# Patient Record
Sex: FEMALE | Race: WHITE | Employment: FULL TIME | URBAN - METROPOLITAN AREA
[De-identification: names, ages, dates, MRNs, and addresses within clinical notes are randomized per-mention and may not be internally consistent; named-entity substitution may affect disease eponyms.]

---

## 2018-11-27 PROBLEM — N93.9 ABNORMAL UTERINE BLEEDING: Status: ACTIVE | Noted: 2018-11-27

## 2019-09-04 ENCOUNTER — OFFICE VISIT (OUTPATIENT)
Dept: FAMILY MEDICINE CLINIC | Age: 24
End: 2019-09-04
Payer: COMMERCIAL

## 2019-09-04 VITALS
DIASTOLIC BLOOD PRESSURE: 76 MMHG | OXYGEN SATURATION: 97 % | SYSTOLIC BLOOD PRESSURE: 114 MMHG | HEIGHT: 59 IN | WEIGHT: 293 LBS | BODY MASS INDEX: 59.07 KG/M2 | HEART RATE: 79 BPM

## 2019-09-04 DIAGNOSIS — Z00.00 ENCOUNTER FOR MEDICAL EXAMINATION TO ESTABLISH CARE: ICD-10-CM

## 2019-09-04 DIAGNOSIS — N91.1 AMENORRHEA, SECONDARY: ICD-10-CM

## 2019-09-04 DIAGNOSIS — N91.1 AMENORRHEA, SECONDARY: Primary | ICD-10-CM

## 2019-09-04 LAB
ALBUMIN SERPL-MCNC: 3.8 G/DL (ref 3.5–4.6)
ALP BLD-CCNC: 85 U/L (ref 40–130)
ALT SERPL-CCNC: 13 U/L (ref 0–33)
ANION GAP SERPL CALCULATED.3IONS-SCNC: 14 MEQ/L (ref 9–15)
AST SERPL-CCNC: 17 U/L (ref 0–35)
BILIRUB SERPL-MCNC: 0.4 MG/DL (ref 0.2–0.7)
BUN BLDV-MCNC: 9 MG/DL (ref 6–20)
CALCIUM SERPL-MCNC: 8.8 MG/DL (ref 8.5–9.9)
CHLORIDE BLD-SCNC: 102 MEQ/L (ref 95–107)
CO2: 24 MEQ/L (ref 20–31)
CREAT SERPL-MCNC: 0.47 MG/DL (ref 0.5–0.9)
ESTRADIOL LEVEL: 64 PG/ML
FOLLICLE STIMULATING HORMONE: 4.3 MIU/ML
GFR AFRICAN AMERICAN: >60
GFR NON-AFRICAN AMERICAN: >60
GLOBULIN: 3.7 G/DL (ref 2.3–3.5)
GLUCOSE BLD-MCNC: 134 MG/DL (ref 70–99)
HBA1C MFR BLD: 5.7 % (ref 4.8–5.9)
HCG QUALITATIVE: NEGATIVE
HCT VFR BLD CALC: 41.2 % (ref 37–47)
HEMOGLOBIN: 13.7 G/DL (ref 12–16)
LUTEINIZING HORMONE: 6.8 MIU/ML
MCH RBC QN AUTO: 30.4 PG (ref 27–31.3)
MCHC RBC AUTO-ENTMCNC: 33.2 % (ref 33–37)
MCV RBC AUTO: 91.7 FL (ref 82–100)
PDW BLD-RTO: 13.3 % (ref 11.5–14.5)
PLATELET # BLD: 385 K/UL (ref 130–400)
POTASSIUM SERPL-SCNC: 4.2 MEQ/L (ref 3.4–4.9)
PROLACTIN: 6.5 NG/ML
RBC # BLD: 4.5 M/UL (ref 4.2–5.4)
SODIUM BLD-SCNC: 140 MEQ/L (ref 135–144)
TOTAL PROTEIN: 7.5 G/DL (ref 6.3–8)
TSH REFLEX: 2.8 UIU/ML (ref 0.44–3.86)
WBC # BLD: 10.1 K/UL (ref 4.8–10.8)

## 2019-09-04 PROCEDURE — 99204 OFFICE O/P NEW MOD 45 MIN: CPT | Performed by: FAMILY MEDICINE

## 2019-09-04 SDOH — HEALTH STABILITY: MENTAL HEALTH: HOW OFTEN DO YOU HAVE A DRINK CONTAINING ALCOHOL?: MONTHLY OR LESS

## 2019-09-04 SDOH — HEALTH STABILITY: MENTAL HEALTH: HOW MANY STANDARD DRINKS CONTAINING ALCOHOL DO YOU HAVE ON A TYPICAL DAY?: 1 OR 2

## 2019-09-04 ASSESSMENT — ENCOUNTER SYMPTOMS
SHORTNESS OF BREATH: 0
COUGH: 0
ABDOMINAL PAIN: 0
WHEEZING: 0
SORE THROAT: 0
CONSTIPATION: 0
DIARRHEA: 0
RHINORRHEA: 0

## 2019-09-04 ASSESSMENT — PATIENT HEALTH QUESTIONNAIRE - PHQ9
SUM OF ALL RESPONSES TO PHQ9 QUESTIONS 1 & 2: 0
SUM OF ALL RESPONSES TO PHQ QUESTIONS 1-9: 0
2. FEELING DOWN, DEPRESSED OR HOPELESS: 0
1. LITTLE INTEREST OR PLEASURE IN DOING THINGS: 0
SUM OF ALL RESPONSES TO PHQ QUESTIONS 1-9: 0

## 2019-09-04 NOTE — PROGRESS NOTES
place, and time. She appears well-developed and well-nourished. No distress. HENT:   Head: Normocephalic and atraumatic. Right Ear: Tympanic membrane, external ear and ear canal normal. Tympanic membrane is not erythematous. Tympanic membrane mobility is normal.   Left Ear: Tympanic membrane, external ear and ear canal normal. Tympanic membrane is not erythematous. Tympanic membrane mobility is normal.   Nose: Nose normal.   Mouth/Throat: Oropharynx is clear and moist. No oropharyngeal exudate. Eyes: EOM are normal.   Neck: Normal range of motion. No thyromegaly present. Cardiovascular: Normal rate, regular rhythm and normal heart sounds. No murmur heard. Pulmonary/Chest: Effort normal and breath sounds normal. No respiratory distress. She has no wheezes. Abdominal: Soft. Bowel sounds are normal. She exhibits no distension. There is no tenderness. There is no rebound and no guarding. Musculoskeletal: She exhibits no edema. Lymphadenopathy:     She has no cervical adenopathy. Neurological: She is alert and oriented to person, place, and time. Skin: Skin is warm and dry. Psychiatric: She has a normal mood and affect. Her behavior is normal.   Vitals reviewed. Assessment/Plan:  25 y.o. female here mainly for establish and secondary amenorrhea:  - I suspect PCOS given the excessive wt gain; checking hormone levels and pelvic US. Diagnosis Orders   1. Amenorrhea, secondary  Follicle Stimulating Hormone    Luteinizing Hormone    Estradiol    Dhea    TSH with Reflex    Prolactin    CBC    Comprehensive Metabolic Panel    US PELVIS COMPLETE    HCG, Serum, Qualitative    Hemoglobin A1C    Testosterone Free And Total, Non Male   2. Encounter for medical examination to establish care          Return if symptoms worsen or fail to improve.     Dahlia Gonzalez MD

## 2019-09-08 LAB
DHEA: 4.4 NG/ML (ref 1.33–7.78)
SEX HORMONE BINDING GLOBULIN: 51 NMOL/L (ref 30–135)
TESTOSTERONE FREE: 5 PG/ML (ref 0.8–7.4)
TESTOSTERONE, FEMALES/CHILDREN: 39 NG/DL (ref 9–55)

## 2019-09-09 DIAGNOSIS — N91.1 AMENORRHEA, SECONDARY: Primary | ICD-10-CM

## 2019-09-13 ENCOUNTER — TELEPHONE (OUTPATIENT)
Dept: FAMILY MEDICINE CLINIC | Age: 24
End: 2019-09-13

## 2019-09-13 DIAGNOSIS — N91.1 AMENORRHEA, SECONDARY: Primary | ICD-10-CM

## 2019-09-13 NOTE — TELEPHONE ENCOUNTER
Flakita Frye from [address] [place] [address] called in because patient is scheduled on 9/17/19 to get a Pelvic US but they also need the Non-transvaginal US ordered as well just in case. Please advise.

## 2019-09-26 ENCOUNTER — HOSPITAL ENCOUNTER (OUTPATIENT)
Dept: LAB | Age: 24
Discharge: HOME | End: 2019-09-26
Payer: COMMERCIAL

## 2019-09-26 ENCOUNTER — OFFICE VISIT (OUTPATIENT)
Dept: OBGYN CLINIC | Age: 24
End: 2019-09-26
Payer: COMMERCIAL

## 2019-09-26 VITALS
SYSTOLIC BLOOD PRESSURE: 120 MMHG | WEIGHT: 293 LBS | DIASTOLIC BLOOD PRESSURE: 80 MMHG | BODY MASS INDEX: 59.07 KG/M2 | HEIGHT: 59 IN

## 2019-09-26 DIAGNOSIS — N91.1 SECONDARY AMENORRHEA: ICD-10-CM

## 2019-09-26 DIAGNOSIS — E66.01 MORBID OBESITY (HCC): ICD-10-CM

## 2019-09-26 DIAGNOSIS — N91.1 SECONDARY AMENORRHEA: Primary | ICD-10-CM

## 2019-09-26 LAB — INSULIN: 34.4 UIU/ML (ref 2.6–24.9)

## 2019-09-26 PROCEDURE — 36415 COLL VENOUS BLD VENIPUNCTURE: CPT

## 2019-09-26 PROCEDURE — 99203 OFFICE O/P NEW LOW 30 MIN: CPT | Performed by: OBSTETRICS & GYNECOLOGY

## 2019-09-26 PROCEDURE — 83525 ASSAY OF INSULIN: CPT

## 2019-09-26 ASSESSMENT — ENCOUNTER SYMPTOMS
APNEA: 0
ABDOMINAL PAIN: 0
SHORTNESS OF BREATH: 0
DIARRHEA: 0
ABDOMINAL DISTENTION: 0
CONSTIPATION: 0

## 2019-09-26 NOTE — PROGRESS NOTES
The patient was asked if she would like a chaperone present for her intimate exam. She  Declined the chaperone.  Sarika Chen

## 2020-05-21 ENCOUNTER — VIRTUAL VISIT (OUTPATIENT)
Dept: FAMILY MEDICINE CLINIC | Age: 25
End: 2020-05-21
Payer: COMMERCIAL

## 2020-05-21 PROCEDURE — 99213 OFFICE O/P EST LOW 20 MIN: CPT | Performed by: FAMILY MEDICINE

## 2020-05-21 RX ORDER — CEPHALEXIN 500 MG/1
500 CAPSULE ORAL 4 TIMES DAILY
Qty: 28 CAPSULE | Refills: 0 | Status: SHIPPED | OUTPATIENT
Start: 2020-05-21 | End: 2020-05-28

## 2020-05-21 RX ORDER — CEPHALEXIN 500 MG/1
500 CAPSULE ORAL 4 TIMES DAILY
Qty: 28 CAPSULE | Refills: 0 | Status: SHIPPED | OUTPATIENT
Start: 2020-05-21 | End: 2020-05-21

## 2020-05-21 ASSESSMENT — PATIENT HEALTH QUESTIONNAIRE - PHQ9
1. LITTLE INTEREST OR PLEASURE IN DOING THINGS: 0
SUM OF ALL RESPONSES TO PHQ9 QUESTIONS 1 & 2: 0
SUM OF ALL RESPONSES TO PHQ QUESTIONS 1-9: 0
2. FEELING DOWN, DEPRESSED OR HOPELESS: 0
SUM OF ALL RESPONSES TO PHQ QUESTIONS 1-9: 0

## 2020-05-21 ASSESSMENT — ENCOUNTER SYMPTOMS
RHINORRHEA: 0
ABDOMINAL PAIN: 0
WHEEZING: 0
COUGH: 0
SHORTNESS OF BREATH: 0
CONSTIPATION: 0
DIARRHEA: 0
SORE THROAT: 0

## 2020-05-27 ENCOUNTER — OFFICE VISIT (OUTPATIENT)
Dept: FAMILY MEDICINE CLINIC | Age: 25
End: 2020-05-27
Payer: COMMERCIAL

## 2020-05-27 PROCEDURE — 99213 OFFICE O/P EST LOW 20 MIN: CPT | Performed by: FAMILY MEDICINE

## 2020-05-27 RX ORDER — NORGESTIMATE AND ETHINYL ESTRADIOL 0.25-0.035
1 KIT ORAL DAILY
Qty: 3 PACKET | Refills: 3 | Status: SHIPPED | OUTPATIENT
Start: 2020-05-27 | End: 2020-06-12 | Stop reason: ALTCHOICE

## 2020-05-27 ASSESSMENT — ENCOUNTER SYMPTOMS
SHORTNESS OF BREATH: 0
CONSTIPATION: 0
RHINORRHEA: 0
SORE THROAT: 0
WHEEZING: 0
COUGH: 0
ABDOMINAL PAIN: 0
DIARRHEA: 0

## 2020-05-27 NOTE — PROGRESS NOTES
2020    TELEHEALTH EVALUATION -- Audio/Visual (During ZVWZJ-91 public health emergency)    Due to [place] 19 outbreak, patient's office visit was converted to a virtual visit. Patient was contacted and agreed to proceed with a virtual visit via [employer]. me  The risks and benefits of converting to a virtual visit were discussed in light of the current infectious disease epidemic. Patient also understood that insurance coverage and co-pays are up to their individual insurance plans. Chief Complaint   Patient presents with    Menstrual Problem     would like to get back on birth control         HPI:    Hammad Rivas (:  1995) has requested an audio/video evaluation for the following concern(s):        DUB: has been off her nuvaring for 1 year but stopped due to wt gain; has had heavy irregular periods after a time period of no menstruations. Gets periods every other month now and they can be excessive with bleeding        Review of Systems   Constitutional: Negative for chills and fever. HENT: Negative for rhinorrhea and sore throat. Respiratory: Negative for cough, shortness of breath and wheezing. Gastrointestinal: Negative for abdominal pain, constipation and diarrhea. Endocrine: Negative for polydipsia and polyuria. Genitourinary: Positive for menstrual problem. Negative for dysuria, frequency and urgency. Neurological: Negative for syncope, light-headedness, numbness and headaches. Psychiatric/Behavioral: Negative for sleep disturbance. The patient is not nervous/anxious. Prior to Visit Medications    Medication Sig Taking?  Authorizing Provider   norgestimate-ethinyl estradiol (ORTHO-CYCLEN, 28,) 0.25-35 MG-MCG per tablet Take 1 tablet by mouth daily Yes Louis Gomez MD   cephALEXin (KEFLEX) 500 MG capsule Take 1 capsule by mouth 4 times daily for 7 days Yes Louis Gomez MD       Social History     Tobacco Use    Smoking status: Former Smoker     Packs/day: 0.25     Years: clinic visit.

## 2020-06-12 ENCOUNTER — APPOINTMENT (OUTPATIENT)
Dept: ULTRASOUND IMAGING | Age: 25
End: 2020-06-12
Payer: COMMERCIAL

## 2020-06-12 ENCOUNTER — HOSPITAL ENCOUNTER (EMERGENCY)
Age: 25
Discharge: HOME | End: 2020-06-12
Attending: EMERGENCY MEDICINE
Payer: COMMERCIAL

## 2020-06-12 VITALS
HEIGHT: 59 IN | DIASTOLIC BLOOD PRESSURE: 53 MMHG | SYSTOLIC BLOOD PRESSURE: 108 MMHG | WEIGHT: 293 LBS | BODY MASS INDEX: 59.07 KG/M2 | TEMPERATURE: 98.5 F | OXYGEN SATURATION: 96 % | RESPIRATION RATE: 18 BRPM | HEART RATE: 84 BPM

## 2020-06-12 LAB
ALBUMIN SERPL-MCNC: 4 G/DL (ref 3.5–4.6)
ALP BLD-CCNC: 67 U/L (ref 40–130)
ALT SERPL-CCNC: 19 U/L (ref 0–33)
AMORPHOUS: ABNORMAL
ANION GAP SERPL CALCULATED.3IONS-SCNC: 11 MEQ/L (ref 9–15)
APTT: 29.8 SEC (ref 24.4–36.8)
AST SERPL-CCNC: 15 U/L (ref 0–35)
BASOPHILS ABSOLUTE: 0.1 K/UL (ref 0–0.2)
BASOPHILS RELATIVE PERCENT: 0.7 %
BILIRUB SERPL-MCNC: <0.2 MG/DL (ref 0.2–0.7)
BILIRUBIN URINE: NEGATIVE
BLOOD, URINE: NORMAL
BUN BLDV-MCNC: 11 MG/DL (ref 6–20)
CALCIUM SERPL-MCNC: 8.7 MG/DL (ref 8.5–9.9)
CHLORIDE BLD-SCNC: 105 MEQ/L (ref 95–107)
CLARITY: NORMAL
CO2: 24 MEQ/L (ref 20–31)
COLOR: YELLOW
CREAT SERPL-MCNC: 0.44 MG/DL (ref 0.5–0.9)
EOSINOPHILS ABSOLUTE: 0.6 K/UL (ref 0–0.7)
EOSINOPHILS RELATIVE PERCENT: 6.3 %
EPITHELIAL CELLS, UA: ABNORMAL /HPF
GFR AFRICAN AMERICAN: >60
GFR NON-AFRICAN AMERICAN: >60
GLOBULIN: 3.8 G/DL (ref 2.3–3.5)
GLUCOSE BLD-MCNC: 122 MG/DL (ref 70–99)
GLUCOSE URINE: NEGATIVE MG/DL
HCG(URINE) PREGNANCY TEST: NEGATIVE
HCT VFR BLD CALC: 37.2 % (ref 37–47)
HEMOGLOBIN: 12.3 G/DL (ref 12–16)
INR BLD: 1
KETONES, URINE: NEGATIVE MG/DL
LEUKOCYTE ESTERASE, URINE: NEGATIVE
LYMPHOCYTES ABSOLUTE: 2.6 K/UL (ref 1–4.8)
LYMPHOCYTES RELATIVE PERCENT: 27.3 %
MCH RBC QN AUTO: 30.3 PG (ref 27–31.3)
MCHC RBC AUTO-ENTMCNC: 33.1 % (ref 33–37)
MCV RBC AUTO: 91.6 FL (ref 82–100)
MONOCYTES ABSOLUTE: 0.8 K/UL (ref 0.2–0.8)
MONOCYTES RELATIVE PERCENT: 8 %
NEUTROPHILS ABSOLUTE: 5.5 K/UL (ref 1.4–6.5)
NEUTROPHILS RELATIVE PERCENT: 57.7 %
NITRITE, URINE: NEGATIVE
PDW BLD-RTO: 13.9 % (ref 11.5–14.5)
PH UA: 5.5 (ref 5–9)
PLATELET # BLD: 426 K/UL (ref 130–400)
POTASSIUM REFLEX MAGNESIUM: 3.9 MEQ/L (ref 3.4–4.9)
PROTEIN UA: NORMAL MG/DL
PROTHROMBIN TIME: 13.2 SEC (ref 12.3–14.9)
RBC # BLD: 4.07 M/UL (ref 4.2–5.4)
RBC UA: ABNORMAL /HPF (ref 0–2)
SODIUM BLD-SCNC: 140 MEQ/L (ref 135–144)
SPECIFIC GRAVITY UA: >=1.03 (ref 1–1.03)
TOTAL PROTEIN: 7.8 G/DL (ref 6.3–8)
URINE REFLEX TO CULTURE: NORMAL
UROBILINOGEN, URINE: 0.2 E.U./DL
WBC # BLD: 9.6 K/UL (ref 4.8–10.8)
WBC UA: ABNORMAL /HPF (ref 0–5)

## 2020-06-12 PROCEDURE — 84703 CHORIONIC GONADOTROPIN ASSAY: CPT

## 2020-06-12 PROCEDURE — 76830 TRANSVAGINAL US NON-OB: CPT

## 2020-06-12 PROCEDURE — 85730 THROMBOPLASTIN TIME PARTIAL: CPT

## 2020-06-12 PROCEDURE — 85610 PROTHROMBIN TIME: CPT

## 2020-06-12 PROCEDURE — 36415 COLL VENOUS BLD VENIPUNCTURE: CPT

## 2020-06-12 PROCEDURE — 81001 URINALYSIS AUTO W/SCOPE: CPT

## 2020-06-12 PROCEDURE — 2709999900 US PELVIS COMPLETE

## 2020-06-12 PROCEDURE — 80053 COMPREHEN METABOLIC PANEL: CPT

## 2020-06-12 PROCEDURE — 85025 COMPLETE CBC W/AUTO DIFF WBC: CPT

## 2020-06-12 PROCEDURE — 99284 EMERGENCY DEPT VISIT MOD MDM: CPT

## 2020-06-12 RX ORDER — MEDROXYPROGESTERONE ACETATE 10 MG/1
10 TABLET ORAL DAILY
Qty: 5 TABLET | Refills: 0 | Status: SHIPPED | OUTPATIENT
Start: 2020-06-12 | End: 2020-06-18

## 2020-06-12 RX ORDER — 0.9 % SODIUM CHLORIDE 0.9 %
500 INTRAVENOUS SOLUTION INTRAVENOUS ONCE
Status: DISCONTINUED | OUTPATIENT
Start: 2020-06-12 | End: 2020-06-12

## 2020-06-12 RX ORDER — METRONIDAZOLE 500 MG/1
500 TABLET ORAL 2 TIMES DAILY
Qty: 20 TABLET | Refills: 0 | Status: SHIPPED | OUTPATIENT
Start: 2020-06-12 | End: 2020-06-18

## 2020-06-12 NOTE — ED PROVIDER NOTES
Negative Negative    Ketones, Urine Negative Negative mg/dL    Specific Gravity, UA >=1.030 1.005 - 1.030    Blood, Urine Large Negative    pH, UA 5.5 5.0 - 9.0    Protein, UA Trace Negative mg/dL    Urobilinogen, Urine 0.2 <2.0 E.U./dL    Nitrite, Urine Negative Negative    Leukocyte Esterase, Urine Negative Negative    Urine Reflex to Culture Not Indicated    Pregnancy, Urine   Result Value Ref Range    HCG(Urine) Pregnancy Test Negative Detects HCG level >20 MIU/mL   CBC Auto Differential   Result Value Ref Range    WBC 9.6 4.8 - 10.8 K/uL    RBC 4.07 (L) 4.20 - 5.40 M/uL    Hemoglobin 12.3 12.0 - 16.0 g/dL    Hematocrit 37.2 37.0 - 47.0 %    MCV 91.6 82.0 - 100.0 fL    MCH 30.3 27.0 - 31.3 pg    MCHC 33.1 33.0 - 37.0 %    RDW 13.9 11.5 - 14.5 %    Platelets 172 (H) 994 - 400 K/uL    Neutrophils % 57.7 %    Lymphocytes % 27.3 %    Monocytes % 8.0 %    Eosinophils % 6.3 %    Basophils % 0.7 %    Neutrophils Absolute 5.5 1.4 - 6.5 K/uL    Lymphocytes Absolute 2.6 1.0 - 4.8 K/uL    Monocytes Absolute 0.8 0.2 - 0.8 K/uL    Eosinophils Absolute 0.6 0.0 - 0.7 K/uL    Basophils Absolute 0.1 0.0 - 0.2 K/uL   Comprehensive Metabolic Panel w/ Reflex to MG   Result Value Ref Range    Sodium 140 135 - 144 mEq/L    Potassium reflex Magnesium 3.9 3.4 - 4.9 mEq/L    Chloride 105 95 - 107 mEq/L    CO2 24 20 - 31 mEq/L    Anion Gap 11 9 - 15 mEq/L    Glucose 122 (H) 70 - 99 mg/dL    BUN 11 6 - 20 mg/dL    CREATININE 0.44 (L) 0.50 - 0.90 mg/dL    GFR Non-African American >60.0 >60    GFR  >60.0 >60    Calcium 8.7 8.5 - 9.9 mg/dL    Total Protein 7.8 6.3 - 8.0 g/dL    Alb 4.0 3.5 - 4.6 g/dL    Total Bilirubin <0.2 0.2 - 0.7 mg/dL    Alkaline Phosphatase 67 40 - 130 U/L    ALT 19 0 - 33 U/L    AST 15 0 - 35 U/L    Globulin 3.8 (H) 2.3 - 3.5 g/dL   Protime-INR   Result Value Ref Range    Protime 13.2 12.3 - 14.9 sec    INR 1.0    APTT   Result Value Ref Range    aPTT 29.8 24.4 - 36.8 sec   Microscopic Urinalysis

## 2020-06-12 NOTE — ED TRIAGE NOTES
Patient complains of vaginal bleeding daily since April 24th, she has seen her PCP who put her on oral birth control 2 weeks ago and it has not improved the bleeding. She denies any injury or trauma. She does not see the OB until Thursday the 18th.

## 2020-06-18 ENCOUNTER — OFFICE VISIT (OUTPATIENT)
Dept: OBGYN CLINIC | Age: 25
End: 2020-06-18
Payer: COMMERCIAL

## 2020-06-18 VITALS
BODY MASS INDEX: 57.52 KG/M2 | WEIGHT: 293 LBS | SYSTOLIC BLOOD PRESSURE: 132 MMHG | HEIGHT: 60 IN | DIASTOLIC BLOOD PRESSURE: 80 MMHG

## 2020-06-18 LAB
HCG, URINE, POC: NEGATIVE
Lab: NORMAL
NEGATIVE QC PASS/FAIL: NORMAL
POSITIVE QC PASS/FAIL: NORMAL

## 2020-06-18 PROCEDURE — 1036F TOBACCO NON-USER: CPT | Performed by: OBSTETRICS & GYNECOLOGY

## 2020-06-18 PROCEDURE — 81025 URINE PREGNANCY TEST: CPT | Performed by: OBSTETRICS & GYNECOLOGY

## 2020-06-18 PROCEDURE — 96372 THER/PROPH/DIAG INJ SC/IM: CPT | Performed by: OBSTETRICS & GYNECOLOGY

## 2020-06-18 PROCEDURE — G8417 CALC BMI ABV UP PARAM F/U: HCPCS | Performed by: OBSTETRICS & GYNECOLOGY

## 2020-06-18 PROCEDURE — 99203 OFFICE O/P NEW LOW 30 MIN: CPT | Performed by: OBSTETRICS & GYNECOLOGY

## 2020-06-18 PROCEDURE — G8427 DOCREV CUR MEDS BY ELIG CLIN: HCPCS | Performed by: OBSTETRICS & GYNECOLOGY

## 2020-06-18 RX ORDER — CETIRIZINE HYDROCHLORIDE 10 MG/1
10 TABLET ORAL DAILY
COMMUNITY

## 2020-06-18 RX ORDER — MEDROXYPROGESTERONE ACETATE 150 MG/ML
150 INJECTION, SUSPENSION INTRAMUSCULAR ONCE
Status: COMPLETED | OUTPATIENT
Start: 2020-06-18 | End: 2020-06-18

## 2020-06-18 RX ADMIN — MEDROXYPROGESTERONE ACETATE 150 MG: 150 INJECTION, SUSPENSION INTRAMUSCULAR at 15:43

## 2020-06-18 NOTE — PROGRESS NOTES
Mother     High Cholesterol Mother    [de-identified] / [place] Mother     Arthritis Mother     Obesity Mother     Allergy (Severe) Father     Obesity Father     High Blood Pressure Sister     Obesity Sister     Breast Cancer Maternal Aunt     Breast Cancer Maternal Grandmother     Prostate Cancer Maternal Grandfather     Allergy (Severe) Paternal Grandmother     Arthritis Paternal Grandmother     Depression Paternal Grandmother     Diabetes Paternal Grandmother     Allergy (Severe) Paternal Grandfather     Arthritis Paternal Grandfather     Asthma Paternal Grandfather     Diabetes Paternal Grandfather     Heart Attack Paternal Grandfather     Heart Disease Paternal Grandfather     High Blood Pressure Paternal Grandfather     High Cholesterol Paternal Grandfather     Kidney Disease Paternal Grandfather     Osteoporosis Paternal Grandfather     Substance Abuse Paternal Grandfather        Review of Systems   Constitutional: Negative. Negative for activity change, appetite change, chills, diaphoresis, fatigue, fever and unexpected weight change. HENT: Negative. Eyes: Negative. Respiratory: Negative. Cardiovascular: Negative. Gastrointestinal: Negative for abdominal distention, abdominal pain, anal bleeding, blood in stool, constipation, diarrhea, nausea, rectal pain and vomiting. Endocrine: Negative. Genitourinary: Positive for menstrual problem (DUB). Negative for decreased urine volume, difficulty urinating, dyspareunia, dysuria, enuresis, flank pain, frequency, genital sores, hematuria, pelvic pain, urgency, vaginal bleeding, vaginal discharge and vaginal pain. Musculoskeletal: Negative. Skin: Negative. Allergic/Immunologic: Negative. Neurological: Negative. Hematological: Negative. Psychiatric/Behavioral: Negative. Objective:     Physical Exam  Constitutional:       General: She is not in acute distress. Appearance: Normal appearance.  She placedthis encounter:  Orders Placed This Encounter   Procedures    Miscellaneous sendout 3     Standing Status:   Future     Standing Expiration Date:   6/18/2021     Order Specific Question:   Specify Req. Test (1 Test/Order)     Answer:   cultures         Follow up:  Return for Ultrasound, Results Review, Endosee w/ bx. The patient was asked if she would like a chaperone present for her intimate exam. She  Declined the chaperone.  Brigette Chavez

## 2020-06-22 ASSESSMENT — ENCOUNTER SYMPTOMS
RESPIRATORY NEGATIVE: 1
ANAL BLEEDING: 0
NAUSEA: 0
CONSTIPATION: 0
VOMITING: 0
ABDOMINAL DISTENTION: 0
ABDOMINAL PAIN: 0
RECTAL PAIN: 0
EYES NEGATIVE: 1
DIARRHEA: 0
BLOOD IN STOOL: 0
ALLERGIC/IMMUNOLOGIC NEGATIVE: 1

## 2020-07-01 ENCOUNTER — TELEPHONE (OUTPATIENT)
Dept: OBGYN CLINIC | Age: 25
End: 2020-07-01

## 2020-07-01 RX ORDER — METRONIDAZOLE 500 MG/1
500 TABLET ORAL 2 TIMES DAILY
Qty: 14 TABLET | Refills: 0 | Status: SHIPPED | OUTPATIENT
Start: 2020-07-01 | End: 2020-07-08

## 2020-09-30 ENCOUNTER — HOSPITAL ENCOUNTER (OUTPATIENT)
Dept: LAB | Age: 25
Discharge: HOME | End: 2020-09-30
Payer: COMMERCIAL

## 2020-09-30 ENCOUNTER — OFFICE VISIT (OUTPATIENT)
Dept: FAMILY MEDICINE CLINIC | Age: 25
End: 2020-09-30
Payer: COMMERCIAL

## 2020-09-30 VITALS
TEMPERATURE: 97.2 F | OXYGEN SATURATION: 98 % | SYSTOLIC BLOOD PRESSURE: 124 MMHG | BODY MASS INDEX: 57.52 KG/M2 | HEIGHT: 60 IN | WEIGHT: 293 LBS | DIASTOLIC BLOOD PRESSURE: 84 MMHG | HEART RATE: 84 BPM

## 2020-09-30 PROBLEM — L40.9 PSORIASIS: Status: ACTIVE | Noted: 2020-09-30

## 2020-09-30 PROBLEM — E66.01 CLASS 3 SEVERE OBESITY DUE TO EXCESS CALORIES WITHOUT SERIOUS COMORBIDITY WITH BODY MASS INDEX (BMI) OF 60.0 TO 69.9 IN ADULT (HCC): Status: ACTIVE | Noted: 2020-09-30

## 2020-09-30 PROBLEM — Z91.09 ENVIRONMENTAL ALLERGIES: Status: ACTIVE | Noted: 2020-09-30

## 2020-09-30 PROBLEM — F32.A DEPRESSION: Status: ACTIVE | Noted: 2020-09-30

## 2020-09-30 LAB
ALBUMIN SERPL-MCNC: 3.9 G/DL (ref 3.5–4.6)
ALP BLD-CCNC: 87 U/L (ref 40–130)
ALT SERPL-CCNC: 12 U/L (ref 0–33)
ANION GAP SERPL CALCULATED.3IONS-SCNC: 13 MEQ/L (ref 9–15)
AST SERPL-CCNC: 13 U/L (ref 0–35)
BILIRUB SERPL-MCNC: 0.4 MG/DL (ref 0.2–0.7)
BUN BLDV-MCNC: 9 MG/DL (ref 6–20)
CALCIUM SERPL-MCNC: 9 MG/DL (ref 8.5–9.9)
CHLORIDE BLD-SCNC: 102 MEQ/L (ref 95–107)
CHOLESTEROL, FASTING: 169 MG/DL (ref 0–199)
CO2: 22 MEQ/L (ref 20–31)
CREAT SERPL-MCNC: 0.42 MG/DL (ref 0.5–0.9)
GFR AFRICAN AMERICAN: >60
GFR NON-AFRICAN AMERICAN: >60
GLOBULIN: 3.5 G/DL (ref 2.3–3.5)
GLUCOSE FASTING: 97 MG/DL (ref 70–99)
HBA1C MFR BLD: 6.8 % (ref 4.8–5.9)
HDLC SERPL-MCNC: 38 MG/DL (ref 40–59)
LDL CHOLESTEROL CALCULATED: 108 MG/DL (ref 0–129)
POTASSIUM SERPL-SCNC: 4.1 MEQ/L (ref 3.4–4.9)
SODIUM BLD-SCNC: 137 MEQ/L (ref 135–144)
TOTAL PROTEIN: 7.4 G/DL (ref 6.3–8)
TRIGLYCERIDE, FASTING: 117 MG/DL (ref 0–150)

## 2020-09-30 PROCEDURE — 80053 COMPREHEN METABOLIC PANEL: CPT

## 2020-09-30 PROCEDURE — 36415 COLL VENOUS BLD VENIPUNCTURE: CPT

## 2020-09-30 PROCEDURE — 90715 TDAP VACCINE 7 YRS/> IM: CPT | Performed by: FAMILY MEDICINE

## 2020-09-30 PROCEDURE — 83036 HEMOGLOBIN GLYCOSYLATED A1C: CPT

## 2020-09-30 PROCEDURE — 80061 LIPID PANEL: CPT

## 2020-09-30 PROCEDURE — 99395 PREV VISIT EST AGE 18-39: CPT | Performed by: FAMILY MEDICINE

## 2020-09-30 PROCEDURE — 87389 HIV-1 AG W/HIV-1&-2 AB AG IA: CPT

## 2020-09-30 PROCEDURE — 90471 IMMUNIZATION ADMIN: CPT | Performed by: FAMILY MEDICINE

## 2020-09-30 PROCEDURE — 90686 IIV4 VACC NO PRSV 0.5 ML IM: CPT | Performed by: FAMILY MEDICINE

## 2020-09-30 PROCEDURE — 90472 IMMUNIZATION ADMIN EACH ADD: CPT | Performed by: FAMILY MEDICINE

## 2020-09-30 RX ORDER — CLOBETASOL PROPIONATE 0.5 MG/G
OINTMENT TOPICAL
Qty: 60 G | Refills: 2 | Status: SHIPPED | OUTPATIENT
Start: 2020-09-30 | End: 2021-09-17

## 2020-09-30 RX ORDER — ESCITALOPRAM OXALATE 10 MG/1
10 TABLET ORAL DAILY
Qty: 30 TABLET | Refills: 3 | Status: SHIPPED | OUTPATIENT
Start: 2020-09-30 | End: 2021-04-27

## 2020-09-30 ASSESSMENT — ENCOUNTER SYMPTOMS
COUGH: 0
RHINORRHEA: 0
WHEEZING: 0
CONSTIPATION: 0
ABDOMINAL PAIN: 0
SORE THROAT: 0
SHORTNESS OF BREATH: 0
DIARRHEA: 0

## 2020-09-30 NOTE — PROGRESS NOTES
[address] [facility] [address] [place] [address] PRIMARY CARE  [address] [place] [id number]  Dept: [phone]  Dept Fax: [phone]: [phone]   Chief Complaint  Chief Complaint   Patient presents with    Annual Exam       HPI:  25 y. o.female who presents for annual exam:    Starting new job with homecare; hx of psoriasis on elbows and ears and knees and wants to see derm. Having lots of hives and allergy symptoms and wants to see allergist. She would like to resume and antidepressant for worsening symptoms recently.  Sees Gyn and had PAP this summer     Past Medical History:   Diagnosis Date    Chronic back pain     Class 3 severe obesity due to excess calories without serious comorbidity with body mass index (BMI) of 60.0 to 69.9 in adult [facility]) 9/30/2020    Depression     Osteoarthritis      Past Surgical History:   Procedure Laterality Date    BACK SURGERY      WISDOM TOOTH EXTRACTION       Social History     Socioeconomic History    Marital status:      Spouse name: Not on file    Number of children: Not on file    Years of education: Not on file    Highest education level: Not on file   Occupational History    Not on file   Social Needs    Financial resource strain: Not on file    Food insecurity     Worry: Not on file     Inability: Not on file    Transportation needs     Medical: Not on file     Non-medical: Not on file   Tobacco Use    Smoking status: Former Smoker     Packs/day: 0.25     Years: 4.00     Pack years: 1.00     Types: Cigarettes     Last attempt to quit: 9/4/2017     Years since quitting: 3.0    Smokeless tobacco: Never Used   Substance and Sexual Activity    Alcohol use: Yes     Frequency: Monthly or less     Drinks per session: 1 or 2     Binge frequency: Never    Drug use: Never    Sexual activity: Yes     Partners: Male   Lifestyle    Physical activity     Days per week: Not on file     Minutes per session: Not on file    Stress: Not on file   Relationships    Social connections     Talks on phone: Not on file     Gets together: Not on file     Attends [employer] service: Not on file     Active member of club or organization: Not on file     Attends meetings of clubs or organizations: Not on file     Relationship status: Not on file    Intimate partner violence     Fear of current or ex partner: Not on file     Emotionally abused: Not on file     Physically abused: Not on file     Forced sexual activity: Not on file   Other Topics Concern    Not on file   Social History Narrative    Not on file     Allergies   Allergen Reactions    Fetzima [Levomilnacipran] Nausea Only     Current Outpatient Medications   Medication Sig Dispense Refill    clobetasol (TEMOVATE) 0.05 % ointment Apply topically 2 times daily. 60 g 2    escitalopram (LEXAPRO) 10 MG tablet Take 1 tablet by mouth daily 30 tablet 3    cetirizine (ZYRTEC) 10 MG tablet Take 10 mg by mouth daily       No current facility-administered medications for this visit. ROS:  Review of Systems   Constitutional: Negative for chills and fever. HENT: Negative for rhinorrhea and sore throat. Respiratory: Negative for cough, shortness of breath and wheezing. Gastrointestinal: Negative for abdominal pain, constipation and diarrhea. Endocrine: Negative for polydipsia and polyuria. Genitourinary: Negative for dysuria, frequency and urgency. Neurological: Negative for syncope, light-headedness, numbness and headaches. Psychiatric/Behavioral: Negative for sleep disturbance. The patient is not nervous/anxious. Vitals:    09/30/20 1035   BP: 124/84   Site: Right Wrist   Position: Sitting   Cuff Size: Medium Adult   Pulse: 84   Temp: 97.2 °F (36.2 °C)   TempSrc: Infrared   SpO2: 98%   Weight: (!) 312 lb (141.5 kg)   Height: 5' (1.524 m)       Physical exam:  Physical Exam  Vitals signs reviewed.    Constitutional:       General: She is not in acute distress. Appearance: She is well-developed. HENT:      Head: Normocephalic and atraumatic. Right Ear: Tympanic membrane, ear canal and external ear normal. Tympanic membrane is not erythematous. Tympanic membrane has normal mobility. Left Ear: Tympanic membrane, ear canal and external ear normal. Tympanic membrane is not erythematous. Tympanic membrane has normal mobility. Nose: Nose normal.      Mouth/Throat:      Pharynx: No oropharyngeal exudate. Neck:      Musculoskeletal: Normal range of motion. Thyroid: No thyromegaly. Cardiovascular:      Rate and Rhythm: Normal rate and regular rhythm. Heart sounds: Normal heart sounds. No murmur. Pulmonary:      Effort: Pulmonary effort is normal. No respiratory distress. Breath sounds: Normal breath sounds. No wheezing. Abdominal:      General: Bowel sounds are normal. There is no distension. Palpations: Abdomen is soft. Tenderness: There is no abdominal tenderness. There is no guarding or rebound. Lymphadenopathy:      Cervical: No cervical adenopathy. Skin:     General: Skin is warm and dry. Neurological:      Mental Status: She is alert and oriented to person, place, and time. Psychiatric:         Behavior: Behavior normal.         Assessment/Plan:  25 y.o. female here mainly for annual:  - routine labs; restarted SSRI with lexapro  - Derm: sending to derm; steroid topical for now  - Sending to allergist per pt request. She'll cont the antihistamines     Diagnosis Orders   1. Annual physical exam  Lipid, Fasting    Comprehensive Metabolic Panel, Fasting   2. Screening for diabetes mellitus (DM)  Hemoglobin A1C   3. Encounter for vaccination  Tdap (age 10y-63y) IM (ADACEL)   4. Psoriasis  clobetasol (TEMOVATE) 0.05 % ointment   5. Environmental allergies  Amb External Referral To Allergy   6. Other depression  escitalopram (LEXAPRO) 10 MG tablet   7.  Encounter for screening for HIV  HIV-1,2 Combo

## 2020-10-01 PROBLEM — E11.9 TYPE 2 DIABETES MELLITUS WITHOUT COMPLICATION, WITHOUT LONG-TERM CURRENT USE OF INSULIN (HCC): Status: ACTIVE | Noted: 2020-10-01

## 2020-10-02 ENCOUNTER — OFFICE VISIT (OUTPATIENT)
Dept: INTERNAL MEDICINE | Age: 25
End: 2020-10-02
Payer: COMMERCIAL

## 2020-10-02 VITALS
TEMPERATURE: 97.3 F | HEART RATE: 64 BPM | OXYGEN SATURATION: 96 % | HEIGHT: 60 IN | WEIGHT: 293 LBS | DIASTOLIC BLOOD PRESSURE: 82 MMHG | SYSTOLIC BLOOD PRESSURE: 132 MMHG | BODY MASS INDEX: 57.52 KG/M2

## 2020-10-02 LAB — HIV 1,2 COMBO ANTIGEN/ANTIBODY: NEGATIVE

## 2020-10-02 PROCEDURE — G8427 DOCREV CUR MEDS BY ELIG CLIN: HCPCS | Performed by: FAMILY MEDICINE

## 2020-10-02 PROCEDURE — G8482 FLU IMMUNIZE ORDER/ADMIN: HCPCS | Performed by: FAMILY MEDICINE

## 2020-10-02 PROCEDURE — 99214 OFFICE O/P EST MOD 30 MIN: CPT | Performed by: FAMILY MEDICINE

## 2020-10-02 PROCEDURE — 2022F DILAT RTA XM EVC RTNOPTHY: CPT | Performed by: FAMILY MEDICINE

## 2020-10-02 PROCEDURE — 3044F HG A1C LEVEL LT 7.0%: CPT | Performed by: FAMILY MEDICINE

## 2020-10-02 PROCEDURE — G8417 CALC BMI ABV UP PARAM F/U: HCPCS | Performed by: FAMILY MEDICINE

## 2020-10-02 PROCEDURE — 1036F TOBACCO NON-USER: CPT | Performed by: FAMILY MEDICINE

## 2020-10-02 RX ORDER — PHENTERMINE HYDROCHLORIDE 37.5 MG/1
37.5 TABLET ORAL
Qty: 30 TABLET | Refills: 0 | Status: SHIPPED | OUTPATIENT
Start: 2020-10-02 | End: 2020-11-01

## 2020-10-02 ASSESSMENT — ENCOUNTER SYMPTOMS
DIARRHEA: 0
SORE THROAT: 0
CONSTIPATION: 0
ABDOMINAL PAIN: 0
WHEEZING: 0
RHINORRHEA: 0
COUGH: 0
SHORTNESS OF BREATH: 0

## 2020-10-02 NOTE — PROGRESS NOTES
[address]  PRIMARY CARE  [address]  [address] [place] [address]  Dept: [phone]  Dept Fax: [phone]: [phone]   Chief Complaint  Chief Complaint   Patient presents with    Discuss Labs     a1c from 9/30       HPI:  25 y. o.female who presents for DM2:    New onset DM2: a1c 6.8 from 5.7; not on meds;  compliant with diet; No excessive thirst, urination, or blurry vision. Has tried dieting but struggling to get below 300. Cut out soda recently (from 60 oz daily); her parents and sister are diabetic as well as grandparents on both sides. BMI 60 at 312 lbs  Breakfast: skip  Lunch: oatmeal (splenda); can of soup  Dinner: crock pot meals with meat and veggie  Snacks: fruits; used be chips  Drinks: water or diet drink    Skin lesion: has noticed a bump on the back of the scalp getting bigger over the past 3 years. Can be a little painful sometimes.     Past Medical History:   Diagnosis Date    Chronic back pain     Class 3 severe obesity due to excess calories without serious comorbidity with body mass index (BMI) of 60.0 to 69.9 in adult [facility]) 9/30/2020    Depression     Osteoarthritis     Type 2 diabetes mellitus without complication, without long-term current use of insulin ([facility] [address] 10/1/2020     Past Surgical History:   Procedure Laterality Date    BACK SURGERY      WISDOM TOOTH EXTRACTION       Social History     Socioeconomic History    Marital status:      Spouse name: Not on file    Number of children: Not on file    Years of education: Not on file    Highest education level: Not on file   Occupational History    Not on file   Social Needs    Financial resource strain: Not on file    Food insecurity     Worry: Not on file     Inability: Not on file    Transportation needs     Medical: Not on file     Non-medical: Not on file   Tobacco Use    Smoking status: Former Smoker     Packs/day: 0.25     Years: 4.00     Pack years: 1.00     Types: Cigarettes     Last attempt to quit: 9/4/2017     Years since quitting: 3.0    Smokeless tobacco: Never Used   Substance and Sexual Activity    Alcohol use: Yes     Frequency: Monthly or less     Drinks per session: 1 or 2     Binge frequency: Never    Drug use: Never    Sexual activity: Yes     Partners: Male   Lifestyle    Physical activity     Days per week: Not on file     Minutes per session: Not on file    Stress: Not on file   Relationships    Social connections     Talks on phone: Not on file     Gets together: Not on file     Attends Worship service: Not on file     Active member of club or organization: Not on file     Attends meetings of clubs or organizations: Not on file     Relationship status: Not on file    Intimate partner violence     Fear of current or ex partner: Not on file     Emotionally abused: Not on file     Physically abused: Not on file     Forced sexual activity: Not on file   Other Topics Concern    Not on file   Social History Narrative    Not on file     Allergies   Allergen Reactions    Fetzima [Levomilnacipran] Nausea Only     Current Outpatient Medications   Medication Sig Dispense Refill    phentermine (ADIPEX-P) 37.5 MG tablet Take 1 tablet by mouth every morning (before breakfast) for 30 days. 30 tablet 0    clobetasol (TEMOVATE) 0.05 % ointment Apply topically 2 times daily. 60 g 2    escitalopram (LEXAPRO) 10 MG tablet Take 1 tablet by mouth daily 30 tablet 3    cetirizine (ZYRTEC) 10 MG tablet Take 10 mg by mouth daily       No current facility-administered medications for this visit. ROS:  Review of Systems   Constitutional: Negative for chills and fever. HENT: Negative for rhinorrhea and sore throat. Respiratory: Negative for cough, shortness of breath and wheezing. Gastrointestinal: Negative for abdominal pain, constipation and diarrhea. Endocrine: Negative for polydipsia and polyuria.    Genitourinary: Negative for dysuria, frequency and urgency. Neurological: Negative for syncope, light-headedness, numbness and headaches. Psychiatric/Behavioral: Negative for sleep disturbance. The patient is not nervous/anxious. Vitals:    10/02/20 1036   BP: 132/82   Site: Left Lower Arm   Position: Sitting   Cuff Size: Large Adult   Pulse: 64   Temp: 97.3 °F (36.3 °C)   TempSrc: Infrared   SpO2: 96%   Weight: (!) 312 lb (141.5 kg)   Height: 5' (1.524 m)       Physical exam:  Physical Exam  Vitals signs reviewed. Constitutional:       General: She is not in acute distress. Appearance: She is well-developed. HENT:      Head: Normocephalic and atraumatic. Mouth/Throat:      Pharynx: No oropharyngeal exudate. Neck:      Musculoskeletal: Normal range of motion. Thyroid: No thyromegaly. Cardiovascular:      Rate and Rhythm: Normal rate and regular rhythm. Heart sounds: Normal heart sounds. No murmur. Pulmonary:      Effort: Pulmonary effort is normal. No respiratory distress. Breath sounds: Normal breath sounds. No wheezing. Abdominal:      General: There is no distension. Palpations: Abdomen is soft. Tenderness: There is no abdominal tenderness. There is no guarding or rebound. Lymphadenopathy:      Cervical: No cervical adenopathy. Skin:     General: Skin is warm and dry. Neurological:      Mental Status: She is alert and oriented to person, place, and time. Psychiatric:         Behavior: Behavior normal.         Assessment/Plan:  25 y.o. female here mainly for newly diagnosed DM2:  - DM2: lots of counseling on meds, lifestyle changes, diet. Starting month 1 adipex as she makes some lifestyle changes; New A1c in 3mo after finishing the adipex  - Scalp cyst: sending to ENT as she'd like to get this removed. Diagnosis Orders   1.  Type 2 diabetes mellitus without complication, without long-term current use of insulin (HCC)     2. Class 3 severe obesity due to excess calories without serious comorbidity with body mass index (BMI) of 60.0 to 69.9 in adult (HCC)  phentermine (ADIPEX-P) 37.5 MG tablet   3. Scalp cyst  Amb External Referral To ENT        Return in about 4 weeks (around 10/30/2020) for wt management.     Litzy Guan MD

## 2020-10-14 ENCOUNTER — PROCEDURE VISIT (OUTPATIENT)
Dept: OBGYN CLINIC | Age: 25
End: 2020-10-14
Payer: COMMERCIAL

## 2020-10-14 VITALS
DIASTOLIC BLOOD PRESSURE: 86 MMHG | HEIGHT: 60 IN | BODY MASS INDEX: 57.52 KG/M2 | WEIGHT: 293 LBS | SYSTOLIC BLOOD PRESSURE: 132 MMHG

## 2020-10-14 LAB
HCG, URINE, POC: NEGATIVE
Lab: NORMAL
NEGATIVE QC PASS/FAIL: NORMAL
POSITIVE QC PASS/FAIL: NORMAL

## 2020-10-14 PROCEDURE — 58100 BIOPSY OF UTERUS LINING: CPT | Performed by: OBSTETRICS & GYNECOLOGY

## 2020-10-14 PROCEDURE — 81025 URINE PREGNANCY TEST: CPT | Performed by: OBSTETRICS & GYNECOLOGY

## 2020-10-14 NOTE — PROGRESS NOTES
Endometrial Biopsy:     Pt here for endometrial bx and ECC ( see previous notes ). Urine HCG negative and denies unprotected intercourse in last 3-4 weeks. Cervix prepped in routine fashion with betadine. EMC/ECC  performed. Uterus sounded to 7-8 cm. Pipelle bx performed in 3 passes without difficulty. No complication noted. Discharge instructions discussed. F/U 2 weeks for results. Vitals:  /86   Ht 5' (1.524 m)   Wt (!) 309 lb (140.2 kg)   BMI 60.35 kg/m²   Past Medical History:   Diagnosis Date    Chronic back pain     Class 3 severe obesity due to excess calories without serious comorbidity with body mass index (BMI) of 60.0 to 69.9 in adult [facility]) 9/30/2020    Depression     Osteoarthritis     Type 2 diabetes mellitus without complication, without long-term current use of insulin ([facility] [address] 10/1/2020     Past Surgical History:   Procedure Laterality Date    BACK SURGERY      WISDOM TOOTH EXTRACTION       Allergies:  Fetzima [levomilnacipran]  Current Outpatient Medications   Medication Sig Dispense Refill    phentermine (ADIPEX-P) 37.5 MG tablet Take 1 tablet by mouth every morning (before breakfast) for 30 days. 30 tablet 0    clobetasol (TEMOVATE) 0.05 % ointment Apply topically 2 times daily. 60 g 2    escitalopram (LEXAPRO) 10 MG tablet Take 1 tablet by mouth daily 30 tablet 3    cetirizine (ZYRTEC) 10 MG tablet Take 10 mg by mouth daily       No current facility-administered medications for this visit.       Social History     Socioeconomic History    Marital status:      Spouse name: Not on file    Number of children: Not on file    Years of education: Not on file    Highest education level: Not on file   Occupational History    Not on file   Social Needs    Financial resource strain: Not on file    Food insecurity     Worry: Not on file     Inability: Not on file    Transportation needs     Medical: Not on file     Non-medical: Not on file   Tobacco Use    defined types were placed in this encounter. Orders placedthis encounter:  Orders Placed This Encounter   Procedures    POC Pregnancy Urine Qual    05694 - [place] BIOPSY OF UTERUS LINING         Follow up:  Return in about 2 weeks (around 10/28/2020) for Results Review. A timeout was performed immediately prior to the start of the ENDOSEE/EMBX procedure and included the correct patient (two identifiers), correct procedure and correct site(s). Procedure consent and allergies were also verified.

## 2020-10-29 ENCOUNTER — OFFICE VISIT (OUTPATIENT)
Dept: OBGYN CLINIC | Age: 25
End: 2020-10-29
Payer: COMMERCIAL

## 2020-10-29 VITALS
HEIGHT: 60 IN | BODY MASS INDEX: 57.52 KG/M2 | SYSTOLIC BLOOD PRESSURE: 122 MMHG | WEIGHT: 293 LBS | DIASTOLIC BLOOD PRESSURE: 76 MMHG

## 2020-10-29 PROCEDURE — 1036F TOBACCO NON-USER: CPT | Performed by: OBSTETRICS & GYNECOLOGY

## 2020-10-29 PROCEDURE — G8427 DOCREV CUR MEDS BY ELIG CLIN: HCPCS | Performed by: OBSTETRICS & GYNECOLOGY

## 2020-10-29 PROCEDURE — G8482 FLU IMMUNIZE ORDER/ADMIN: HCPCS | Performed by: OBSTETRICS & GYNECOLOGY

## 2020-10-29 PROCEDURE — G8417 CALC BMI ABV UP PARAM F/U: HCPCS | Performed by: OBSTETRICS & GYNECOLOGY

## 2020-10-29 PROCEDURE — 99213 OFFICE O/P EST LOW 20 MIN: CPT | Performed by: OBSTETRICS & GYNECOLOGY

## 2020-10-29 RX ORDER — BLOOD-GLUCOSE METER
EACH MISCELLANEOUS
COMMUNITY
Start: 2020-10-08

## 2020-10-29 RX ORDER — BLOOD SUGAR DIAGNOSTIC
STRIP MISCELLANEOUS
COMMUNITY
Start: 2020-10-08

## 2020-10-29 RX ORDER — BLOOD-GLUCOSE METER
1 KIT MISCELLANEOUS DAILY
COMMUNITY
Start: 2020-10-08 | End: 2022-10-13

## 2020-10-29 ASSESSMENT — ENCOUNTER SYMPTOMS
ALLERGIC/IMMUNOLOGIC NEGATIVE: 1
RESPIRATORY NEGATIVE: 1
ANAL BLEEDING: 0
BLOOD IN STOOL: 0
ABDOMINAL PAIN: 0
DIARRHEA: 0
EYES NEGATIVE: 1
NAUSEA: 0
ABDOMINAL DISTENTION: 0
CONSTIPATION: 0
RECTAL PAIN: 0
VOMITING: 0

## 2020-10-29 NOTE — PROGRESS NOTES
Patient here for results of endometrial bx for DUB ( see previous notes ). EMB negative ( proliferative endometrium ). Discussed and all questions answered. Has started on Depo Provera for cycle regulation and to offset any effects of unopposed estrogen on endometrium. F/U annual exam or prn. Pt was seen with total face to face time of 15 minutes with more than 50% of the visit being counseling and education regarding encounter dx of DUB. See discussion /counseling details as stated above. Vitals:  /76   Ht 5' (1.524 m)   Wt (!) 308 lb (139.7 kg)   BMI 60.15 kg/m²   Past Medical History:   Diagnosis Date    Chronic back pain     Class 3 severe obesity due to excess calories without serious comorbidity with body mass index (BMI) of 60.0 to 69.9 in adult [facility]) 9/30/2020    Depression     Osteoarthritis     Type 2 diabetes mellitus without complication, without long-term current use of insulin ([facility] [address] 10/1/2020     Past Surgical History:   Procedure Laterality Date    BACK SURGERY      WISDOM TOOTH EXTRACTION       Allergies:  Fetzima [levomilnacipran]  Current Outpatient Medications   Medication Sig Dispense Refill    Blood Glucose Monitoring Suppl (ONE TOUCH ULTRA MINI) w/Device KIT 1 each by NOT APPLICABLE route daily      metFORMIN (GLUCOPHAGE) 500 MG tablet       Blood Glucose Monitoring Suppl (ONE TOUCH ULTRA 2) w/Device KIT       ONETOUCH ULTRA strip       phentermine (ADIPEX-P) 37.5 MG tablet Take 1 tablet by mouth every morning (before breakfast) for 30 days. 30 tablet 0    clobetasol (TEMOVATE) 0.05 % ointment Apply topically 2 times daily. 60 g 2    escitalopram (LEXAPRO) 10 MG tablet Take 1 tablet by mouth daily 30 tablet 3    cetirizine (ZYRTEC) 10 MG tablet Take 10 mg by mouth daily       No current facility-administered medications for this visit.       Social History     Socioeconomic History    Marital status:      Spouse name: Not on file    Number of children: Not on file    Years of education: Not on file    Highest education level: Not on file   Occupational History    Not on file   Social Needs    Financial resource strain: Not on file    Food insecurity     Worry: Not on file     Inability: Not on file    Transportation needs     Medical: Not on file     Non-medical: Not on file   Tobacco Use    Smoking status: Former Smoker     Packs/day: 0.25     Years: 4.00     Pack years: 1.00     Types: Cigarettes     Last attempt to quit: 9/4/2017     Years since quitting: 3.1    Smokeless tobacco: Never Used   Substance and Sexual Activity    Alcohol use: Yes     Frequency: Monthly or less     Drinks per session: 1 or 2     Binge frequency: Never    Drug use: Never    Sexual activity: Yes     Partners: Male   Lifestyle    Physical activity     Days per week: Not on file     Minutes per session: Not on file    Stress: Not on file   Relationships    Social connections     Talks on phone: Not on file     Gets together: Not on file     Attends Yarsani service: Not on file     Active member of club or organization: Not on file     Attends meetings of clubs or organizations: Not on file     Relationship status: Not on file    Intimate partner violence     Fear of current or ex partner: Not on file     Emotionally abused: Not on file     Physically abused: Not on file     Forced sexual activity: Not on file   Other Topics Concern    Not on file   Social History Narrative    Not on file        Family History   Problem Relation Age of Onset    Diabetes Mother     High Cholesterol Mother     Miscarriages / [place] Mother     Arthritis Mother     Obesity Mother     Allergy (Severe) Father     Obesity Father     High Blood Pressure Sister     Obesity Sister     Breast Cancer Maternal Aunt     Breast Cancer Maternal Grandmother     Prostate Cancer Maternal Grandfather     Allergy (Severe) Paternal Grandmother     Arthritis Paternal Grandmother  Depression Paternal Grandmother     Diabetes Paternal Grandmother     Allergy (Severe) Paternal Grandfather     Arthritis Paternal Grandfather     Asthma Paternal Grandfather     Diabetes Paternal Grandfather     Heart Attack Paternal Grandfather     Heart Disease Paternal Grandfather     High Blood Pressure Paternal Grandfather     High Cholesterol Paternal Grandfather     Kidney Disease Paternal Grandfather     Osteoporosis Paternal Grandfather     Substance Abuse Paternal Grandfather        Review of Systems   Constitutional: Negative. Negative for activity change, appetite change, chills, diaphoresis, fatigue, fever and unexpected weight change. HENT: Negative. Eyes: Negative. Respiratory: Negative. Cardiovascular: Negative. Gastrointestinal: Negative for abdominal distention, abdominal pain, anal bleeding, blood in stool, constipation, diarrhea, nausea, rectal pain and vomiting. Endocrine: Negative. Genitourinary: Negative for decreased urine volume, difficulty urinating, dyspareunia, dysuria, enuresis, flank pain, frequency, genital sores, hematuria, menstrual problem, pelvic pain, urgency, vaginal bleeding, vaginal discharge and vaginal pain. Musculoskeletal: Negative. Skin: Negative. Allergic/Immunologic: Negative. Neurological: Negative. Hematological: Negative. Psychiatric/Behavioral: Negative. Objective:     Physical Exam  Constitutional:       General: She is not in acute distress. Appearance: She is well-developed. She is not diaphoretic. HENT:      Head: Normocephalic and atraumatic. Eyes:      Conjunctiva/sclera: Conjunctivae normal.   Neck:      Musculoskeletal: Normal range of motion and neck supple. Cardiovascular:      Rate and Rhythm: Normal rate and regular rhythm. Pulmonary:      Effort: Pulmonary effort is normal. No respiratory distress. Musculoskeletal: Normal range of motion.          General: No tenderness or deformity. Skin:     General: Skin is warm and dry. Coloration: Skin is not pale. Neurological:      Mental Status: She is alert and oriented to person, place, and time. Motor: No abnormal muscle tone. Coordination: Coordination normal.   Psychiatric:         Behavior: Behavior normal.         Thought Content: Thought content normal.         Judgment: Judgment normal.         Assessment:          Diagnosis Orders   1. Irregular periods/menstrual cycles     2. Dysmenorrhea     3. Secondary amenorrhea          Plan:      Medications placedthis encounter:  No orders of the defined types were placed in this encounter. Orders placedthis encounter:  No orders of the defined types were placed in this encounter.         Follow up:  Return for Annual.

## 2020-11-04 ENCOUNTER — VIRTUAL VISIT (OUTPATIENT)
Dept: FAMILY MEDICINE CLINIC | Age: 25
End: 2020-11-04
Payer: COMMERCIAL

## 2020-11-04 ENCOUNTER — HOSPITAL ENCOUNTER (OUTPATIENT)
Age: 25
Setting detail: SPECIMEN
Discharge: HOME | End: 2020-11-04
Payer: COMMERCIAL

## 2020-11-04 PROCEDURE — 99214 OFFICE O/P EST MOD 30 MIN: CPT | Performed by: FAMILY MEDICINE

## 2020-11-04 RX ORDER — PHENTERMINE HYDROCHLORIDE 37.5 MG/1
37.5 TABLET ORAL
COMMUNITY
End: 2020-11-04 | Stop reason: SDUPTHER

## 2020-11-04 RX ORDER — PHENTERMINE HYDROCHLORIDE 37.5 MG/1
37.5 TABLET ORAL
Qty: 30 TABLET | Refills: 0 | Status: SHIPPED | OUTPATIENT
Start: 2020-11-04 | End: 2020-12-04

## 2020-11-04 NOTE — PROGRESS NOTES
2020    TELEHEALTH EVALUATION -- Audio/Visual (During WMWUA-81 public health emergency)    Due to COVID 19 outbreak, patient's office visit was converted to a virtual visit. Patient was contacted and agreed to proceed with a virtual visit via [email or web address] me  The risks and benefits of converting to a virtual visit were discussed in light of the current infectious disease epidemic. Patient also understood that insurance coverage and co-pays are up to their individual insurance plans. Chief Complaint   Patient presents with    Weight Management     1 month follow up, Adipex    Concern For COVID-19     Pt was around someone who was positive         HPI:    Keila Keith (:  1995) has requested an audio/video evaluation for the following concern(s):        Wt management: BMI 60 at 312 lbs; Wt 298 lbs after month 1 adipex;   Breakfast: skip  Lunch: oatmeal (splenda); can of soup  Dinner: crock pot meals with meat and veggie  Snacks: fruits; used be chips  Drinks: water; no soda  Activity: started Osteomimetics twice weekly      COVID exposure: a friend was at her house 10/31/20; her friend tested positive yesterday (20). Pt herself without symptoms. Works in group home. Review of Systems    Prior to Visit Medications    Medication Sig Taking? Authorizing Provider   phentermine (ADIPEX-P) 37.5 MG tablet Take 1 tablet by mouth every morning (before breakfast) for 30 days. Yes Katie Mendoza MD   metFORMIN (GLUCOPHAGE) 500 MG tablet  Yes Historical Provider, MD   Blood Glucose Monitoring Suppl (ONE TOUCH ULTRA 2) w/Device KIT  Yes Historical Provider, MD   Blood Glucose Monitoring Suppl (ONE TOUCH ULTRA MINI) w/Device KIT 1 each by NOT APPLICABLE route daily Yes Historical Provider, MD   ONETOUCH ULTRA strip  Yes Historical Provider, MD   clobetasol (TEMOVATE) 0.05 % ointment Apply topically 2 times daily.  Yes Katie Mendoza MD   escitalopram (LEXAPRO) 10 MG tablet Take 1 tablet by mouth daily Yes Jordi Chaidez MD   cetirizine (ZYRTEC) 10 MG tablet Take 10 mg by mouth daily Yes Historical Provider, MD       Social History     Tobacco Use    Smoking status: Former Smoker     Packs/day: 0.25     Years: 4.00     Pack years: 1.00     Types: Cigarettes     Last attempt to quit: 9/4/2017     Years since quitting: 3.1    Smokeless tobacco: Never Used   Substance Use Topics    Alcohol use: Yes     Frequency: Monthly or less     Drinks per session: 1 or 2     Binge frequency: Never    Drug use: Never        Allergies   Allergen Reactions    Fetzima [Levomilnacipran] Nausea Only   ,   Past Medical History:   Diagnosis Date    Chronic back pain     Class 3 severe obesity due to excess calories without serious comorbidity with body mass index (BMI) of 60.0 to 69.9 in adult ([facility] 9/30/2020    Depression     Osteoarthritis     Type 2 diabetes mellitus without complication, without long-term current use of insulin ([facility] 10/1/2020   ,   Past Surgical History:   Procedure Laterality Date    BACK SURGERY      WISDOM TOOTH EXTRACTION     ,   Social History     Tobacco Use    Smoking status: Former Smoker     Packs/day: 0.25     Years: 4.00     Pack years: 1.00     Types: Cigarettes     Last attempt to quit: 9/4/2017     Years since quitting: 3.1    Smokeless tobacco: Never Used   Substance Use Topics    Alcohol use: Yes     Frequency: Monthly or less     Drinks per session: 1 or 2     Binge frequency: Never    Drug use: Never   ,   Family History   Problem Relation Age of Onset    Diabetes Mother     High Cholesterol Mother     Miscarriages / Stillbirths Mother     Arthritis Mother     Obesity Mother     Allergy (Severe) Father     Obesity Father     High Blood Pressure Sister     Obesity Sister     Breast Cancer Maternal Aunt     Breast Cancer Maternal Grandmother     Prostate Cancer Maternal Grandfather     Allergy (Severe) Paternal Grandmother     Arthritis Paternal Grandmother     Depression Paternal Grandmother     Diabetes Paternal Grandmother     Allergy (Severe) Paternal Grandfather     Arthritis Paternal Grandfather     Asthma Paternal Grandfather     Diabetes Paternal Grandfather     Heart Attack Paternal Grandfather     Heart Disease Paternal Grandfather     High Blood Pressure Paternal Grandfather     High Cholesterol Paternal Grandfather     Kidney Disease Paternal Grandfather     Osteoporosis Paternal Grandfather     Substance Abuse Paternal Grandfather    ,   Immunization History   Administered Date(s) Administered    Influenza, Quadv, IM, PF (6 mo and older Fluzone, Flulaval, Fluarix, and 3 yrs and older Afluria) 09/30/2020    Tdap (Boostrix, Adacel) 09/30/2020       PHYSICAL EXAMINATION:  [ INSTRUCTIONS:  \"[x]\" Indicates a positive item  \"[]\" Indicates a negative item  -- DELETE ALL ITEMS NOT EXAMINED]  [] Alert  [] Oriented to person/place/time    [] No apparent distress  [] Toxic appearing    [] Face flushed appearing [] Sclera clear  [] Lips are cyanotic      [] Breathing appears normal  [] Appears tachypneic      [] Rash on visible skin    [] Cranial Nerves II-XII grossly intact    [] Motor grossly intact in visible upper extremities    [] Motor grossly intact in visible lower extremities    [] Normal Mood  [] Anxious appearing    [] Depressed appearing  [] Confused appearing      [] Poor short term memory  [] Poor long term memory    [] OTHER:      Due to this being a TeleHealth encounter, evaluation of the following organ systems is limited: Vitals/Constitutional/EENT/Resp/CV/GI//MS/Neuro/Skin/Heme-Lymph-Imm. ASSESSMENT/PLAN:  - Obesity: starting month 2 adipex; making good progress  - COVID exposure: testing for COVID      1. COVID-19 ruled out    - COVID-19 Ambulatory; Future    2. Class 3 severe obesity due to excess calories without serious comorbidity with body mass index (BMI) of 60.0 to 69.9 in adult (HCC)    - phentermine (ADIPEX-P) 37.5 MG tablet;  Take 1 tablet by mouth every morning (before breakfast) for 30 days. Dispense: 30 tablet; Refill: 0      Return in about 4 weeks (around 12/2/2020) for [facility] management. An  electronic signature was used to authenticate this note. --Mariya Wahl MD on 11/4/2020 at 1:22 PM        Pursuant to the emergency declaration under the [address] [place] [address], Critical access hospital waiver authority and the [employer] Act, this Virtual  Visit was conducted, with patient's consent, to reduce the patient's risk of exposure to COVID-19 and provide continuity of care for an established patient. Services were provided through a video synchronous discussion virtually to substitute for in-person clinic visit.

## 2020-11-06 LAB
SARS-COV-2: NOT DETECTED
SOURCE: NORMAL

## 2020-11-10 ENCOUNTER — TELEPHONE (OUTPATIENT)
Dept: FAMILY MEDICINE CLINIC | Age: 25
End: 2020-11-10

## 2020-12-18 ENCOUNTER — VIRTUAL VISIT (OUTPATIENT)
Dept: INTERNAL MEDICINE | Age: 25
End: 2020-12-18
Payer: COMMERCIAL

## 2020-12-18 DIAGNOSIS — Z20.822 CLOSE EXPOSURE TO COVID-19 VIRUS: ICD-10-CM

## 2020-12-18 PROCEDURE — 99213 OFFICE O/P EST LOW 20 MIN: CPT | Performed by: FAMILY MEDICINE

## 2020-12-18 ASSESSMENT — ENCOUNTER SYMPTOMS
COUGH: 0
WHEEZING: 0
ABDOMINAL PAIN: 0
CONSTIPATION: 0
DIARRHEA: 0
RHINORRHEA: 0
SHORTNESS OF BREATH: 0
SORE THROAT: 0

## 2020-12-18 NOTE — PROGRESS NOTES
2020    TELEHEALTH EVALUATION -- Audio/Visual (During XQVTG-88 public health emergency)    Due to COVID 19 outbreak, patient's office visit was converted to a virtual visit. Patient was contacted and agreed to proceed with a virtual visit via [email or web address] me  The risks and benefits of converting to a virtual visit were discussed in light of the current infectious disease epidemic. Patient also understood that insurance coverage and co-pays are up to their individual insurance plans. Chief Complaint   Patient presents with    Concern For COVID-19     exposed to someone who tested positive at work  &12/10        HPI:    Rosa Jacob (:  1995) has requested an audio/video evaluation for the following concern(s):        COVID concern: last week was around a COVID pos person ( and 12/10). Pt with no symptoms; no fevers. Review of Systems   Constitutional: Negative for chills and fever. HENT: Negative for rhinorrhea and sore throat. Respiratory: Negative for cough, shortness of breath and wheezing. Gastrointestinal: Negative for abdominal pain, constipation and diarrhea. Endocrine: Negative for polydipsia and polyuria. Genitourinary: Negative for dysuria, frequency and urgency. Neurological: Negative for syncope, light-headedness, numbness and headaches. Psychiatric/Behavioral: Negative for sleep disturbance. The patient is not nervous/anxious. Prior to Visit Medications    Medication Sig Taking? Authorizing Provider   metFORMIN (GLUCOPHAGE) 500 MG tablet  Yes Historical Provider, MD   Blood Glucose Monitoring Suppl (ONE TOUCH ULTRA 2) w/Device KIT  Yes Historical Provider, MD   Blood Glucose Monitoring Suppl (ONE TOUCH ULTRA MINI) w/Device KIT 1 each by NOT APPLICABLE route daily Yes Historical Provider, MD   ONETOUCH ULTRA strip  Yes Historical Provider, MD   clobetasol (TEMOVATE) 0.05 % ointment Apply topically 2 times daily.  Yes Roger Carpio MD   escitalopram (LEXAPRO) 10 MG tablet Take 1 tablet by mouth daily Yes Linnette Reid MD   cetirizine (ZYRTEC) 10 MG tablet Take 10 mg by mouth daily Yes Historical Provider, MD       Social History     Tobacco Use    Smoking status: Former Smoker     Packs/day: 0.25     Years: 4.00     Pack years: 1.00     Types: Cigarettes     Quit date: 9/4/2017     Years since quitting: 3.2    Smokeless tobacco: Never Used   Substance Use Topics    Alcohol use: Yes     Frequency: Monthly or less     Drinks per session: 1 or 2     Binge frequency: Never    Drug use: Never        Allergies   Allergen Reactions    Fetzima [Levomilnacipran] Nausea Only   ,   Past Medical History:   Diagnosis Date    Chronic back pain     Class 3 severe obesity due to excess calories without serious comorbidity with body mass index (BMI) of 60.0 to 69.9 in adult ([facility] 9/30/2020    Depression     Osteoarthritis     Type 2 diabetes mellitus without complication, without long-term current use of insulin ([facility] 10/1/2020   ,   Past Surgical History:   Procedure Laterality Date    BACK SURGERY      WISDOM TOOTH EXTRACTION     ,   Social History     Tobacco Use    Smoking status: Former Smoker     Packs/day: 0.25     Years: 4.00     Pack years: 1.00     Types: Cigarettes     Quit date: 9/4/2017     Years since quitting: 3.2    Smokeless tobacco: Never Used   Substance Use Topics    Alcohol use: Yes     Frequency: Monthly or less     Drinks per session: 1 or 2     Binge frequency: Never    Drug use: Never   ,   Family History   Problem Relation Age of Onset    Diabetes Mother     High Cholesterol Mother     Miscarriages / Stillbirths Mother     Arthritis Mother     Obesity Mother     Allergy (Severe) Father     Obesity Father     High Blood Pressure Sister     Obesity Sister     Breast Cancer Maternal Aunt     Breast Cancer Maternal Grandmother     Prostate Cancer Maternal Grandfather     Allergy (Severe) Paternal Grandmother     Arthritis Paternal Grandmother     Depression Paternal Grandmother     Diabetes Paternal Grandmother     Allergy (Severe) Paternal Grandfather     Arthritis Paternal Grandfather     Asthma Paternal Grandfather     Diabetes Paternal Grandfather     Heart Attack Paternal Grandfather     Heart Disease Paternal Grandfather     High Blood Pressure Paternal Grandfather     High Cholesterol Paternal Grandfather     Kidney Disease Paternal Grandfather     Osteoporosis Paternal Grandfather     Substance Abuse Paternal Grandfather    ,   Immunization History   Administered Date(s) Administered    Influenza, Quadv, IM, PF (6 mo and older Fluzone, Flulaval, Fluarix, and 3 yrs and older Afluria) 09/30/2020    Tdap (Boostrix, Adacel) 09/30/2020       PHYSICAL EXAMINATION:  [ INSTRUCTIONS:  \"[x]\" Indicates a positive item  \"[]\" Indicates a negative item  -- DELETE ALL ITEMS NOT EXAMINED]  [x] Alert  [x] Oriented to person/place/time    [x] No apparent distress  [] Toxic appearing    [] Face flushed appearing [] Sclera clear  [] Lips are cyanotic      [x] Breathing appears normal  [] Appears tachypneic      [] Rash on visible skin    [] Cranial Nerves II-XII grossly intact    [] Motor grossly intact in visible upper extremities    [] Motor grossly intact in visible lower extremities    [x] Normal Mood  [] Anxious appearing    [] Depressed appearing  [] Confused appearing      [] Poor short term memory  [] Poor long term memory    [] OTHER:      Due to this being a TeleHealth encounter, evaluation of the following organ systems is limited: Vitals/Constitutional/EENT/Resp/CV/GI//MS/Neuro/Skin/Heme-Lymph-Imm. ASSESSMENT/PLAN:  - checking COVID test    1. Close exposure to COVID-19 virus    - COVID-19 Ambulatory; Future      Return if symptoms worsen or fail to improve. An  electronic signature was used to authenticate this note.     --Roger Carpio MD on 12/18/2020 at 10:21 AM        Pursuant to the emergency declaration

## 2020-12-22 LAB
SARS-COV-2: NOT DETECTED
SOURCE: NORMAL

## 2021-04-27 ENCOUNTER — OFFICE VISIT (OUTPATIENT)
Dept: FAMILY MEDICINE CLINIC | Age: 26
End: 2021-04-27
Payer: COMMERCIAL

## 2021-04-27 VITALS
OXYGEN SATURATION: 98 % | BODY MASS INDEX: 57.52 KG/M2 | WEIGHT: 293 LBS | DIASTOLIC BLOOD PRESSURE: 70 MMHG | SYSTOLIC BLOOD PRESSURE: 122 MMHG | HEIGHT: 60 IN | TEMPERATURE: 97.6 F | HEART RATE: 92 BPM

## 2021-04-27 DIAGNOSIS — R30.0 DYSURIA: ICD-10-CM

## 2021-04-27 DIAGNOSIS — N30.00 ACUTE CYSTITIS WITHOUT HEMATURIA: Primary | ICD-10-CM

## 2021-04-27 LAB
APPEARANCE FLUID: ABNORMAL
BILIRUBIN, POC: ABNORMAL
BLOOD URINE, POC: ABNORMAL
CLARITY, POC: ABNORMAL
COLOR, POC: YELLOW
GLUCOSE URINE, POC: ABNORMAL
KETONES, POC: ABNORMAL
LEUKOCYTE EST, POC: ABNORMAL
NITRITE, POC: ABNORMAL
PH, POC: 7
PROTEIN, POC: ABNORMAL
SPECIFIC GRAVITY, POC: 1.02
UROBILINOGEN, POC: ABNORMAL

## 2021-04-27 PROCEDURE — 81002 URINALYSIS NONAUTO W/O SCOPE: CPT | Performed by: FAMILY MEDICINE

## 2021-04-27 PROCEDURE — 99213 OFFICE O/P EST LOW 20 MIN: CPT | Performed by: FAMILY MEDICINE

## 2021-04-27 RX ORDER — NITROFURANTOIN 25; 75 MG/1; MG/1
100 CAPSULE ORAL 2 TIMES DAILY
Qty: 14 CAPSULE | Refills: 0 | Status: SHIPPED | OUTPATIENT
Start: 2021-04-27 | End: 2021-05-04

## 2021-04-27 RX ORDER — ETONOGESTREL AND ETHINYL ESTRADIOL 11.7; 2.7 MG/1; MG/1
1 INSERT, EXTENDED RELEASE VAGINAL
COMMUNITY
Start: 2021-04-21 | End: 2021-07-09

## 2021-04-27 SDOH — ECONOMIC STABILITY: INCOME INSECURITY: HOW HARD IS IT FOR YOU TO PAY FOR THE VERY BASICS LIKE FOOD, HOUSING, MEDICAL CARE, AND HEATING?: NOT HARD AT ALL

## 2021-04-27 SDOH — ECONOMIC STABILITY: TRANSPORTATION INSECURITY
IN THE PAST 12 MONTHS, HAS LACK OF TRANSPORTATION KEPT YOU FROM MEETINGS, WORK, OR FROM GETTING THINGS NEEDED FOR DAILY LIVING?: NO

## 2021-04-27 SDOH — ECONOMIC STABILITY: FOOD INSECURITY: WITHIN THE PAST 12 MONTHS, THE FOOD YOU BOUGHT JUST DIDN'T LAST AND YOU DIDN'T HAVE MONEY TO GET MORE.: NEVER TRUE

## 2021-04-27 SDOH — ECONOMIC STABILITY: FOOD INSECURITY: WITHIN THE PAST 12 MONTHS, YOU WORRIED THAT YOUR FOOD WOULD RUN OUT BEFORE YOU GOT MONEY TO BUY MORE.: NEVER TRUE

## 2021-04-27 ASSESSMENT — ENCOUNTER SYMPTOMS
SHORTNESS OF BREATH: 0
ABDOMINAL PAIN: 0
COUGH: 0
DIARRHEA: 0
SORE THROAT: 0
WHEEZING: 0
RHINORRHEA: 0
CONSTIPATION: 0

## 2021-04-27 NOTE — PROGRESS NOTES
Never true    Transportation needs     Medical: No     Non-medical: No   Tobacco Use    Smoking status: Former Smoker     Packs/day: 0.25     Years: 4.00     Pack years: 1.00     Types: Cigarettes     Quit date: 9/4/2017     Years since quitting: 3.6    Smokeless tobacco: Never Used   Substance and Sexual Activity    Alcohol use: Yes     Frequency: Monthly or less     Drinks per session: 1 or 2     Binge frequency: Never    Drug use: Never    Sexual activity: Yes     Partners: Male   Lifestyle    Physical activity     Days per week: Not on file     Minutes per session: Not on file    Stress: Not on file   Relationships    Social connections     Talks on phone: Not on file     Gets together: Not on file     Attends Taoist service: Not on file     Active member of club or organization: Not on file     Attends meetings of clubs or organizations: Not on file     Relationship status: Not on file    Intimate partner violence     Fear of current or ex partner: Not on file     Emotionally abused: Not on file     Physically abused: Not on file     Forced sexual activity: Not on file   Other Topics Concern    Not on file   Social History Narrative    Not on file     Family History   Problem Relation Age of Onset    Diabetes Mother     High Cholesterol Mother     Miscarriages / [place] Mother     Arthritis Mother     Obesity Mother     Allergy (Severe) Father     Obesity Father     High Blood Pressure Sister     Obesity Sister     Breast Cancer Maternal Aunt     Breast Cancer Maternal Grandmother     Prostate Cancer Maternal Grandfather     Allergy (Severe) Paternal Grandmother     Arthritis Paternal Grandmother     Depression Paternal Grandmother     Diabetes Paternal Grandmother     Allergy (Severe) Paternal Grandfather     Arthritis Paternal Grandfather     Asthma Paternal Grandfather     Diabetes Paternal Grandfather     Heart Attack Paternal Grandfather     Heart Disease Paternal Grandfather     High Blood Pressure Paternal Grandfather     High Cholesterol Paternal Grandfather     Kidney Disease Paternal Grandfather     Osteoporosis Paternal Grandfather     Substance Abuse Paternal Grandfather       Allergies   Allergen Reactions    Fetzima [Levomilnacipran] Nausea Only     Current Outpatient Medications   Medication Sig Dispense Refill    etonogestrel-ethinyl estradiol (NUVARING) 0.12-0.015 MG/24HR vaginal ring Place 1 each vaginally      nitrofurantoin, macrocrystal-monohydrate, (MACROBID) 100 MG capsule Take 1 capsule by mouth 2 times daily for 7 days 14 capsule 0    metFORMIN (GLUCOPHAGE) 500 MG tablet       Blood Glucose Monitoring Suppl (ONE TOUCH ULTRA 2) w/Device KIT       Blood Glucose Monitoring Suppl (ONE TOUCH ULTRA MINI) w/Device KIT 1 each by NOT APPLICABLE route daily      ONETOUCH ULTRA strip       clobetasol (TEMOVATE) 0.05 % ointment Apply topically 2 times daily. 60 g 2    cetirizine (ZYRTEC) 10 MG tablet Take 10 mg by mouth daily       No current facility-administered medications for this visit. Vitals:    04/27/21 1422   BP: 122/70   Site: Left Lower Arm   Position: Sitting   Cuff Size: Large Adult   Pulse: 92   Temp: 97.6 °F (36.4 °C)   TempSrc: Infrared   SpO2: 98%   Weight: (!) 325 lb (147.4 kg)   Height: 5' (1.524 m)       Physical exam:  Physical Exam  Vitals signs reviewed. Constitutional:       General: She is not in acute distress. Appearance: She is well-developed. HENT:      Head: Normocephalic and atraumatic. Neck:      Musculoskeletal: Normal range of motion. Cardiovascular:      Rate and Rhythm: Normal rate. Pulmonary:      Effort: Pulmonary effort is normal. No respiratory distress. Abdominal:      Palpations: Abdomen is soft. Tenderness: There is no abdominal tenderness. Skin:     General: Skin is warm and dry. Neurological:      Mental Status: She is alert and oriented to person, place, and time.

## 2021-05-19 DIAGNOSIS — E66.01 CLASS 3 SEVERE OBESITY DUE TO EXCESS CALORIES WITHOUT SERIOUS COMORBIDITY WITH BODY MASS INDEX (BMI) OF 60.0 TO 69.9 IN ADULT (HCC): ICD-10-CM

## 2021-05-20 RX ORDER — PHENTERMINE HYDROCHLORIDE 37.5 MG/1
TABLET ORAL
Qty: 30 TABLET | OUTPATIENT
Start: 2021-05-20

## 2021-05-20 NOTE — TELEPHONE ENCOUNTER
Rx requested:  Requested Prescriptions     Pending Prescriptions Disp Refills    phentermine (ADIPEX-P) 37.5 MG tablet [Pharmacy Med Name: PHENTERMINE 37.5 MG TABLET] 30 tablet      Sig: take 1 tablet by mouth every morning before breakfast       Last Office Visit:   4/27/2021      Last filled:  11/4/2020    Next Visit Date:  No future appointments.

## 2021-07-21 ENCOUNTER — TELEPHONE (OUTPATIENT)
Dept: FAMILY MEDICINE CLINIC | Age: 26
End: 2021-07-21

## 2021-07-21 ENCOUNTER — OFFICE VISIT (OUTPATIENT)
Dept: FAMILY MEDICINE CLINIC | Age: 26
End: 2021-07-21
Payer: COMMERCIAL

## 2021-07-21 VITALS
HEART RATE: 80 BPM | SYSTOLIC BLOOD PRESSURE: 110 MMHG | BODY MASS INDEX: 57.52 KG/M2 | OXYGEN SATURATION: 98 % | DIASTOLIC BLOOD PRESSURE: 88 MMHG | TEMPERATURE: 97 F | WEIGHT: 293 LBS | HEIGHT: 60 IN

## 2021-07-21 DIAGNOSIS — J06.9 ACUTE URI: Primary | ICD-10-CM

## 2021-07-21 PROCEDURE — 99213 OFFICE O/P EST LOW 20 MIN: CPT | Performed by: FAMILY MEDICINE

## 2021-07-21 RX ORDER — PSEUDOEPHEDRINE HCL 120 MG/1
120 TABLET, FILM COATED, EXTENDED RELEASE ORAL EVERY 12 HOURS
Qty: 14 TABLET | Refills: 0 | Status: SHIPPED | OUTPATIENT
Start: 2021-07-21 | End: 2021-07-28

## 2021-07-21 ASSESSMENT — ENCOUNTER SYMPTOMS
WHEEZING: 0
SHORTNESS OF BREATH: 0
COUGH: 1
RHINORRHEA: 0
DIARRHEA: 0
ABDOMINAL PAIN: 0
SORE THROAT: 0
CONSTIPATION: 0

## 2021-07-21 NOTE — TELEPHONE ENCOUNTER
----- Message from Joseph January sent at 7/21/2021  8:48 AM EDT -----  Subject: Message to Provider    QUESTIONS  Information for Provider? pt. is having a sore throat, cough, congestion   and coughing up mucus   ---------------------------------------------------------------------------  --------------  CALL BACK INFO  What is the best way for the office to contact you? OK to leave message on   voicemail  Preferred Call Back Phone Number? [phone]  ---------------------------------------------------------------------------  --------------  SCRIPT ANSWERS  Relationship to Patient? Self  Appointment reason? Symptomatic  Select script based on patient symptoms? Adult Cough/Cold Symptoms [Runny   Nose, Sore Throat, Flu, Sinus, Sinus Infection, Upper Respiratory   Infection [URI], Congestion]  Are you currently unable to finish sentences due to any difficulty   breathing? No  Are you unable to swallow liquids? No  Are you having fevers (100.4 or greater), chills, or sweats? No  Do you have COPD, asthma or a chronic lung condition? No  Have your symptoms been present for more than 5 days?  No

## 2021-07-21 NOTE — PROGRESS NOTES
[address] [facility] PRIMARY CARE  [address] [address]  Dept: [phone]  Dept Fax: [phone]: [phone]     Chief Complaint  Chief Complaint   Patient presents with    Cough     since saturday, sinus congestion, yellow mucus       HPI:  22 y. o.female who presents for the following:      URI symptoms: x4 days with increased mucus/cough, sometimes the mucus gags her; L ear pain; no n/v, no sore throat. Temp 99.5 today; someone at home had a URI recently; tolerating PO. Hx of asthma as a child and only using albuterol this week without relief; takes zyrtec and has bad allergies. Review of Systems   Constitutional: Negative for chills and fever. HENT: Positive for congestion and postnasal drip. Negative for rhinorrhea and sore throat. Respiratory: Positive for cough. Negative for shortness of breath and wheezing. Gastrointestinal: Negative for abdominal pain, constipation and diarrhea. Endocrine: Negative for polydipsia and polyuria. Genitourinary: Negative for dysuria, frequency and urgency. Neurological: Negative for syncope, light-headedness, numbness and headaches. Psychiatric/Behavioral: Negative for sleep disturbance. The patient is not nervous/anxious.         Past Medical History:   Diagnosis Date    Abnormal uterine bleeding 11/27/2018    Anxiety     BMI 50.0-59.9, adult ([facility])     Chronic back pain     Class 3 severe obesity due to excess calories without serious comorbidity with body mass index (BMI) of 60.0 to 69.9 in adult ([facility] [address] 9/30/2020    Daytime sleepiness     Depression     Depressive disorder, not elsewhere classified 8/7/2015    BRITT (dyspnea on exertion)     DUB (dysfunctional uterine bleeding)     Fatigue     GERD (gastroesophageal reflux disease)     Irregular menses     Osteoarthritis     Psoriasis     Scoliosis     surgery 2008    Shortness of breath at rest    Marcelo.Pilling Type 2 diabetes mellitus without complication, without long-term current use of insulin ([facility] [address] 10/1/2020     Past Surgical History:   Procedure Laterality Date    BACK SURGERY      SPINE SURGERY      for scoliosis    WISDOM TOOTH EXTRACTION       Social History     Socioeconomic History    Marital status:      Spouse name: Not on file    Number of children: Not on file    Years of education: Not on file    Highest education level: Not on file   Occupational History    Not on file   Tobacco Use    Smoking status: Former Smoker     Packs/day: 0.25     Years: 4.00     Pack years: 1.00     Types: Cigarettes     Quit date: 9/4/2017     Years since quitting: 3.8    Smokeless tobacco: Never Used    Tobacco comment: Vapes \"Occasionally\"   Vaping Use    Vaping Use: Former   Substance and Sexual Activity    Alcohol use: Yes     Comment: once per month    Drug use: Never    Sexual activity: Yes     Partners: Male   Other Topics Concern    Not on file   Social History Narrative    ** Merged History Encounter **          Social Determinants of Health     Financial Resource Strain: Low Risk     Difficulty of Paying Living Expenses: Not hard at all   Food Insecurity: No Food Insecurity    Worried About [address] in the Last Year: Never true    [facility] in the Last Year: Never true   Transportation Needs: No Transportation Needs    Lack of Transportation (Medical): No    Lack of Transportation (Non-Medical):  No   Physical Activity:     Days of Exercise per Week:     Minutes of Exercise per Session:    Stress:     Feeling of Stress :    Social Connections:     Frequency of Communication with Friends and Family:     Frequency of Social Gatherings with Friends and Family:     Attends Worship Services:     Active Member of Clubs or Organizations:     Attends Club or Organization Meetings:     Marital Status:    Intimate Partner Violence:     Fear of Current or Ex-Partner:     Emotionally Abused:     Physically Abused:     Sexually Abused:      Family History   Problem Relation Age of Onset    Diabetes Mother     High Cholesterol Mother    [de-identified] / Stillbirths Mother     Arthritis Mother     Obesity Mother     Allergy (Severe) Father     Obesity Father     High Blood Pressure Sister     Obesity Sister     Breast Cancer Maternal Aunt     Breast Cancer Maternal Grandmother     Prostate Cancer Maternal Grandfather     Allergy (Severe) Paternal Grandmother     Arthritis Paternal Grandmother     Depression Paternal Grandmother     Diabetes Paternal Grandmother     Allergy (Severe) Paternal Grandfather     Arthritis Paternal Grandfather     Asthma Paternal Grandfather     Diabetes Paternal Grandfather     Heart Attack Paternal Grandfather     Heart Disease Paternal Grandfather     High Blood Pressure Paternal Grandfather     High Cholesterol Paternal Grandfather     Kidney Disease Paternal Grandfather     Osteoporosis Paternal Grandfather     Substance Abuse Paternal Grandfather     High Blood Pressure Sister     Cancer Maternal Aunt         breast, unknown age of dx    Cancer Maternal Grandmother         breast, unknown age of dx    Cancer Maternal Aunt         breast, unknown age of dx      Allergies   Allergen Reactions    Motrin [Ibuprofen] Hives and Swelling    Pineapple Hives    Fetzima [Levomilnacipran] Nausea And Vomiting     Current Outpatient Medications   Medication Sig Dispense Refill    pseudoephedrine (DECONGESTANT 12HOUR MAX ST) 120 MG extended release tablet Take 1 tablet by mouth every 12 hours for 7 days 14 tablet 0    metFORMIN (GLUCOPHAGE) 500 MG tablet Take 500 mg by mouth 2 times daily (with meals) Indications: Diabetes       clobetasol (TEMOVATE) 0.05 % ointment Apply topically 2 times daily.  60 g 2    cetirizine (ZYRTEC) 10 MG tablet Take 10 mg by mouth daily Indications: Seasonal Allergy       etonogestrel-ethinyl estradiol (NUVARING) 0.12-0.015 MG/24HR vaginal ring Place 1 each vaginally every 21 days Insert one (1) ring vaginally and leave in place for three (3) weeks, then remove for one (1) week. 3 each 3    Blood Glucose Monitoring Suppl (ONE TOUCH ULTRA 2) w/Device KIT       Blood Glucose Monitoring Suppl (ONE TOUCH ULTRA MINI) w/Device KIT 1 each by NOT APPLICABLE route daily      ONETOUCH ULTRA strip        No current facility-administered medications for this visit. Vitals:    07/21/21 1007   BP: 110/88   Pulse: 80   Temp: 97 °F (36.1 °C)   TempSrc: Infrared   SpO2: 98%   Weight: (!) 327 lb (148.3 kg)   Height: 5' (1.524 m)       Physical exam:  Physical Exam  Vitals reviewed. Constitutional:       General: She is not in acute distress. Appearance: She is well-developed. HENT:      Head: Normocephalic and atraumatic. Right Ear: Tympanic membrane, ear canal and external ear normal. Tympanic membrane is not erythematous. Tympanic membrane has normal mobility. Left Ear: Tympanic membrane, ear canal and external ear normal. Tympanic membrane is not erythematous. Tympanic membrane has normal mobility. Nose: Nose normal.      Mouth/Throat:      Pharynx: No oropharyngeal exudate. Neck:      Thyroid: No thyromegaly. Cardiovascular:      Rate and Rhythm: Normal rate and regular rhythm. Heart sounds: Normal heart sounds. No murmur heard. Pulmonary:      Effort: Pulmonary effort is normal. No respiratory distress. Breath sounds: Normal breath sounds. No wheezing. Abdominal:      General: Bowel sounds are normal. There is no distension. Palpations: Abdomen is soft. Tenderness: There is no abdominal tenderness. There is no guarding or rebound. Musculoskeletal:      Cervical back: Normal range of motion. Lymphadenopathy:      Cervical: No cervical adenopathy. Skin:     General: Skin is warm and dry.    Neurological:      Mental Status: She is alert and oriented to person, place, and time. Psychiatric:         Behavior: Behavior normal.         Assessment/Plan:  22 y.o. female here mainly for URI:  - URI: benign exam; already vaccinated for COVID; supportive care for now     Diagnosis Orders   1. Acute URI  pseudoephedrine (DECONGESTANT 12HOUR MAX ST) 120 MG extended release tablet        Return if symptoms worsen or fail to improve.     Herrera Colvin MD

## 2021-09-07 PROBLEM — E66.01 MORBID OBESITY DUE TO EXCESS CALORIES (HCC): Status: ACTIVE | Noted: 2021-09-07

## 2021-09-20 ENCOUNTER — OFFICE VISIT (OUTPATIENT)
Dept: OBGYN CLINIC | Age: 26
End: 2021-09-20
Payer: COMMERCIAL

## 2021-09-20 VITALS
BODY MASS INDEX: 57.52 KG/M2 | SYSTOLIC BLOOD PRESSURE: 134 MMHG | WEIGHT: 293 LBS | HEIGHT: 60 IN | DIASTOLIC BLOOD PRESSURE: 88 MMHG

## 2021-09-20 DIAGNOSIS — N92.6 IRREGULAR PERIODS/MENSTRUAL CYCLES: ICD-10-CM

## 2021-09-20 DIAGNOSIS — Z01.419 WOMEN'S ANNUAL ROUTINE GYNECOLOGICAL EXAMINATION: Primary | ICD-10-CM

## 2021-09-20 DIAGNOSIS — Z11.3 ROUTINE SCREENING FOR STI (SEXUALLY TRANSMITTED INFECTION): ICD-10-CM

## 2021-09-20 DIAGNOSIS — Z11.51 SCREENING FOR HUMAN PAPILLOMAVIRUS: ICD-10-CM

## 2021-09-20 PROCEDURE — 99395 PREV VISIT EST AGE 18-39: CPT | Performed by: OBSTETRICS & GYNECOLOGY

## 2021-09-20 PROCEDURE — 99212 OFFICE O/P EST SF 10 MIN: CPT | Performed by: OBSTETRICS & GYNECOLOGY

## 2021-09-20 ASSESSMENT — ENCOUNTER SYMPTOMS
DIARRHEA: 0
NAUSEA: 0
ALLERGIC/IMMUNOLOGIC NEGATIVE: 1
ABDOMINAL DISTENTION: 0
RESPIRATORY NEGATIVE: 1
EYES NEGATIVE: 1
VOMITING: 0
RECTAL PAIN: 0
ABDOMINAL PAIN: 0
CONSTIPATION: 0
ANAL BLEEDING: 0
BLOOD IN STOOL: 0

## 2021-09-20 ASSESSMENT — VISUAL ACUITY: OU: 1

## 2021-09-20 NOTE — PROGRESS NOTES
Subjective:      Patient ID: Bulmaro Israel is a 22 y.o. female    Annual exam.  No GYN complaints. Normal cycles on Nuvaring. Pap performed. STD screening offered. Monthly SBE encouraged. F/U annual or prn. Patient also here to discuss other contraceptive methods extending her appointment time by 10 minutes. Having bariatric surgery 3/2022. Discussed Depo Provera, Nexplanon, Nuvaring, Mirena IUD, Nga IUD and Xulene patch. Discussed risks and benefits of each method and all questions answered. After discussion, patient opted to start Mirena IUD. F/U as directed.         Vitals:  /88   Ht 5' (1.524 m)   Wt (!) 330 lb (149.7 kg)   LMP 08/24/2021   BMI 64.45 kg/m²   Past Medical History:   Diagnosis Date    Abnormal uterine bleeding 11/27/2018    Anxiety     Asthma     Back pain     Blurred vision     BMI 50.0-59.9, adult ([facility])     Chronic back pain     Class 3 severe obesity due to excess calories without serious comorbidity with body mass index (BMI) of 60.0 to 69.9 in adult ([address] 9/30/2020    Daytime sleepiness     Daytime sleepiness     Depression     Depressive disorder, not elsewhere classified 8/7/2015    Diabetes mellitus ([address]     Difficulty sleeping     BRITT (dyspnea on exertion)     DUB (dysfunctional uterine bleeding)     Fatigue     GERD (gastroesophageal reflux disease)     Heartburn     History of UTI     Irregular menses     Irregular menses     Joint pain, knee     Osteoarthritis     Psoriasis     Scoliosis     surgery 2008    Shortness of breath at rest     Shortness of breath at rest     Snoring     SOBOE (shortness of breath on exertion)     SOBOE (shortness of breath on exertion)     Type 2 diabetes mellitus without complication, without long-term current use of insulin ([address] 10/1/2020     Past Surgical History:   Procedure Laterality Date    BACK SURGERY      SPINE SURGERY      for scoliosis    WISDOM TOOTH EXTRACTION Allergies:  Motrin [ibuprofen], Pineapple, and Fetzima [levomilnacipran]  Current Outpatient Medications   Medication Sig Dispense Refill    Clobetasol Propionate 0.05 % LIQD       metFORMIN (GLUCOPHAGE) 500 MG tablet Take 500 mg by mouth 2 times daily (with meals) Indications: Diabetes       Blood Glucose Monitoring Suppl (ONE TOUCH ULTRA 2) w/Device KIT       ONETOUCH ULTRA strip       cetirizine (ZYRTEC) 10 MG tablet Take 10 mg by mouth daily Indications: Seasonal Allergy       etonogestrel-ethinyl estradiol (NUVARING) 0.12-0.015 MG/24HR vaginal ring Place 1 each vaginally every 21 days Insert one (1) ring vaginally and leave in place for three (3) weeks, then remove for one (1) week. 3 each 3    Blood Glucose Monitoring Suppl (ONE TOUCH ULTRA MINI) w/Device KIT 1 each by NOT APPLICABLE route daily       No current facility-administered medications for this visit.      Social History     Socioeconomic History    Marital status:      Spouse name: Not on file    Number of children: Not on file    Years of education: Not on file    Highest education level: Not on file   Occupational History    Not on file   Tobacco Use    Smoking status: Former Smoker     Packs/day: 0.25     Years: 4.00     Pack years: 1.00     Types: Cigarettes     Quit date: 2017     Years since quittin.0    Smokeless tobacco: Never Used    Tobacco comment: Vapes \"Occasionally\"   Vaping Use    Vaping Use: Former   Substance and Sexual Activity    Alcohol use: Not Currently     Comment: SOCIALLY WEEKENDS    Drug use: Never    Sexual activity: Yes     Partners: Male     Birth control/protection: Inserts     Comment: Nuvaring   Other Topics Concern    Not on file   Social History Narrative    ** Merged History Encounter **          Social Determinants of Health     Financial Resource Strain: Low Risk     Difficulty of Paying Living Expenses: Not hard at all   Food Insecurity: No Food Insecurity    Worried About Running Out of Food in the Last Year: Never true    Ran Out of Food in the Last Year: Never true   Transportation Needs: No Transportation Needs    Lack of Transportation (Medical): No    Lack of Transportation (Non-Medical):  No   Physical Activity:     Days of Exercise per Week:     Minutes of Exercise per Session:    Stress:     Feeling of Stress :    Social Connections:     Frequency of Communication with Friends and Family:     Frequency of Social Gatherings with Friends and Family:     Attends Faith Services:     Active Member of Clubs or Organizations:     Attends Club or Organization Meetings:     Marital Status:    Intimate Partner Violence:     Fear of Current or Ex-Partner:     Emotionally Abused:     Physically Abused:     Sexually Abused:      Family History   Problem Relation Age of Onset    Diabetes Mother     High Cholesterol Mother    [place] Mother     Arthritis Mother     Obesity Mother     Hypertension Mother     Stroke Mother     Allergy (Severe) Father     Obesity Father     High Blood Pressure Sister     Obesity Sister     Breast Cancer Maternal Aunt     Breast Cancer Maternal Grandmother     Cancer Maternal Grandmother         breast, unknown age of dx    Obesity Maternal Grandmother     Diabetes Maternal Grandmother     Hypertension Maternal Grandmother     Stroke Maternal Grandmother     Prostate Cancer Maternal Grandfather     Heart Disease Maternal Grandfather     Allergy (Severe) Paternal Grandmother     Arthritis Paternal Grandmother     Depression Paternal Grandmother     Diabetes Paternal Grandmother     Hypertension Paternal Grandmother     Other Paternal Grandmother         blood clot    Allergy (Severe) Paternal Grandfather     Arthritis Paternal Grandfather     Asthma Paternal Grandfather     Diabetes Paternal Grandfather     Heart Attack Paternal Grandfather     Heart Disease Paternal Grandfather     High Blood Pressure Paternal Grandfather     High Cholesterol Paternal Grandfather     Kidney Disease Paternal Grandfather     Osteoporosis Paternal Grandfather     Substance Abuse Paternal Grandfather     Obesity Paternal Grandfather     Stroke Paternal Grandfather     High Blood Pressure Sister     Cancer Maternal Aunt         breast, unknown age of dx    Cancer Maternal Aunt         breast, unknown age of dx       Review of Systems   Constitutional: Negative. Negative for activity change, appetite change, chills, diaphoresis, fatigue, fever and unexpected weight change. HENT: Negative. Eyes: Negative. Respiratory: Negative. Cardiovascular: Negative. Gastrointestinal: Negative for abdominal distention, abdominal pain, anal bleeding, blood in stool, constipation, diarrhea, nausea, rectal pain and vomiting. Endocrine: Negative. Genitourinary: Negative for decreased urine volume, difficulty urinating, dyspareunia, dysuria, enuresis, flank pain, frequency, genital sores, hematuria, menstrual problem, pelvic pain, urgency, vaginal bleeding, vaginal discharge and vaginal pain. Musculoskeletal: Negative. Skin: Negative. Allergic/Immunologic: Negative. Neurological: Negative. Hematological: Negative. Psychiatric/Behavioral: Negative. Objective:     Physical Exam  Constitutional:       Appearance: She is well-developed. HENT:      Head: Normocephalic. Eyes:      General: Lids are normal. Vision grossly intact. Neck:      Thyroid: No thyromegaly. Cardiovascular:      Rate and Rhythm: Normal rate and regular rhythm. Heart sounds: Normal heart sounds. Pulmonary:      Effort: Pulmonary effort is normal. No respiratory distress. Breath sounds: Normal breath sounds. No wheezing or rales. Chest:      Chest wall: No tenderness. Breasts:         Right: Normal. No swelling, bleeding, inverted nipple, mass, nipple discharge, skin change or tenderness. Left: Normal. No swelling, bleeding, inverted nipple, mass, nipple discharge, skin change or tenderness. Abdominal:      General: There is no distension. Palpations: Abdomen is soft. There is no mass. Tenderness: There is no abdominal tenderness. There is no guarding or rebound. Hernia: No hernia is present. There is no hernia in the left inguinal area or right inguinal area. Genitourinary:     General: Normal vulva. Pubic Area: No rash. Labia:         Right: No rash, tenderness, lesion or injury. Left: No rash, tenderness, lesion or injury. Urethra: No prolapse, urethral swelling or urethral lesion. Vagina: Normal. No signs of injury and foreign body. No vaginal discharge, erythema, tenderness or bleeding. Cervix: No cervical motion tenderness, discharge or friability. Uterus: Not deviated, not enlarged, not fixed and not tender. Adnexa:         Right: No mass, tenderness or fullness. Left: No mass, tenderness or fullness. Rectum: Normal.   Musculoskeletal:         General: No tenderness. Normal range of motion. Cervical back: Normal range of motion and neck supple. Lymphadenopathy:      Cervical: No cervical adenopathy. Upper Body:      Right upper body: No supraclavicular or axillary adenopathy. Left upper body: No supraclavicular or axillary adenopathy. Lower Body: No right inguinal adenopathy. No left inguinal adenopathy. Skin:     General: Skin is warm and dry. Coloration: Skin is not pale. Findings: No erythema or rash. Neurological:      Mental Status: She is alert and oriented to person, place, and time. Psychiatric:         Behavior: Behavior normal.         Thought Content: Thought content normal.         Judgment: Judgment normal.         Assessment:       Diagnosis Orders   1. Women's annual routine gynecological examination  PAP SMEAR   2.  Screening for human papillomavirus  PAP SMEAR 3. Routine screening for STI (sexually transmitted infection)  PAP SMEAR        Plan:      Medications placedthis encounter:  No orders of the defined types were placed in this encounter. Orders placedthis encounter:  Orders Placed This Encounter   Procedures    PAP SMEAR     Standing Status:   Future     Standing Expiration Date:   9/20/2022     Order Specific Question:   Collection Type     Answer: Thin Prep     Order Specific Question:   Prior Abnormal Pap Test     Answer:   No     Order Specific Question:   Screening or Diagnostic     Answer:   Screening     Order Specific Question:   HPV Requested? Answer:   Yes     Order Specific Question:   High Risk Patient     Answer:   N/A         Follow up:  Return for Annual, Mirena placement. Repeat Annual GYN exam every 1 year. Cervical Cytology exam starts age 24. If Negative Cytology;  follow-up screening per current guidelines. Mammograms yearly starting at age 36. Calcium and Vitamin D dosing reviewed ( age appropriate ). Colonoscopy and bone density screening discussed ( age appropriate ). Birth control and STD prevention discussed ( age appropriate ). Gardisil counseling completed for all patients ( age appropriate ). Routine health maintenance ( per PCP and guidelines ). The patient was asked if she would like a chaperone present for her intimate exam. She  Declined the chaperone.  Laith Reddy MD

## 2021-09-20 NOTE — PATIENT INSTRUCTIONS
Patient Education        Learning About Birth Control: Intrauterine Device (IUD)  What is an intrauterine device (IUD)? The intrauterine device (IUD) is used to prevent pregnancy. It's a small, plastic, T-shaped device. Your doctor places the IUD in your uterus. If you and your doctor discuss it before you give birth, this can be done right after you have your baby. You have a choice between a hormonal IUD and a copper IUD. The hormonal IUD can prevent pregnancy for 3 to 6 years, depending on which IUD is used. But your doctor may talk to you about leaving it in for longer. When you have it, you don't have to do anything else to prevent pregnancy. The copper IUD can prevent pregnancy for 10 years. But your doctor may talk to you about leaving it in for longer. When you have it, you don't have to do anything else to prevent pregnancy. A string tied to the end of the IUD hangs down through the opening of the uterus (called the cervix) into the vagina. You can check that the IUD is in place by feeling for the string. The IUD usually stays in the uterus until your doctor removes it. How well does an IUD for birth control work? In the first year of use:  · When the hormonal IUD is used exactly as directed, fewer than 1 out of 100 women have an unplanned pregnancy. · When the copper IUD is used exactly as directed, fewer than 1 out of 100 women have an unplanned pregnancy. Be sure to tell your doctor about any health problems you have or medicines you take. Your doctor can help you choose the birth control method that's right for you. What are the advantages of an IUD? · An IUD is one of the most effective methods of birth control. · It prevents pregnancy for 3 to 10 years, depending on the type. You don't have to worry about birth control during this time. · It's safe to use while breastfeeding. · IUDs don't contain estrogen.  So you can use an IUD if you don't want to take estrogen or can't take estrogen because you have certain health problems or concerns. · An IUD is convenient. It is always providing birth control. You don't need to remember to take a pill or get a shot. You don't have to interrupt sex to protect against pregnancy. · A hormonal IUD may reduce heavy bleeding and cramping. What are the disadvantages of an IUD? · An IUD doesn't protect against sexually transmitted infections (STIs), such as herpes or HIV/AIDS. If you aren't sure if your sex partner might have an STI, use a condom to protect against disease. · A copper IUD may cause periods with more bleeding and cramping. · You have to see a doctor to have an IUD inserted and removed. Where can you learn more? Go to [email or web address]://[email or web address] org and sign in to your SocialMatica account. Enter Y244 in the Skinfix box to learn more about \"Learning About Birth Control: Intrauterine Device (IUD). \"     If you do not have an account, please click on the \"Sign Up Now\" link. Current as of: October 8, 2020               Content Version: 12.9  © 2006-2021 [employer] Care instructions adapted under license by [facility] ([facility]). If you have questions about a medical condition or this instruction, always ask your healthcare professional. [address] any warranty or liability for your use of this information. Patient Education        Intrauterine Device (IUD) Insertion: Care Instructions  Your Care Instructions     An intrauterine device (IUD) is a very effective method of birth control. It is a small, plastic, T-shaped device that contains copper or hormones. The doctor inserts the IUD into your uterus. You can have an IUD inserted at any time, as long as you aren't pregnant and you don't have a pelvic infection. If you and your doctor discuss it before you give birth, this can be done right after you have your baby.  A plastic string tied to the end of the IUD hangs down through the cervix into the vagina. Your doctor may have you feel for the IUD string right after insertion, to be sure you know what it feels like. There are two types of IUDs. The copper IUD works for up to 10 years. The hormonal IUD works for either 3 years or 6 years, depending on which IUD is used. But your doctor may talk to you about leaving it in for longer. The hormonal IUD also reduces menstrual bleeding and cramping. Follow-up care is a key part of your treatment and safety. Be sure to make and go to all appointments, and call your doctor if you are having problems. It's also a good idea to know your test results and keep a list of the medicines you take. How can you care for yourself at home? · It's safe to use while breastfeeding. · You may experience some mild cramping and light bleeding (spotting) for 1 or 2 days. Use a hot water bottle or a heating pad set on low on your belly for pain. · Take an over-the-counter pain medicine, such as acetaminophen (Tylenol), ibuprofen (Advil, Motrin), and naproxen (Aleve) if needed. Read and follow all instructions on the label. · Do not take two or more pain medicines at the same time unless the doctor told you to. Many pain medicines have acetaminophen, which is Tylenol. Too much acetaminophen (Tylenol) can be harmful. · If you want to check the string of your IUD, insert a finger into your vagina and feel for the cervix, which is at the top of the vagina and feels harder than the rest of your vagina. You should be able to feel the thin, plastic string coming out of the opening of your cervix. If you cannot feel the string, use another form of birth control and make an appointment with your doctor to have the string checked. · If the IUD comes out, save it and call your doctor. Be sure to use another form of birth control while the IUD is out. · Use latex condoms to protect against sexually transmitted infections (STIs), such as gonorrhea and chlamydia.  An IUD does not protect you from STIs. Having one sex partner (who does not have STIs and does not have sex with anyone else) is a good way to avoid STIs. When should you call for help? Call 911 anytime you think you may need emergency care. For example, call if:    · You passed out (lost consciousness).     · You have sudden, severe pain in your belly or pelvis. Call your doctor now or seek immediate medical care if:    · You have new belly or pelvic pain.     · You have severe vaginal bleeding. This means that you are soaking through your usual pads or tampons each hour for 2 or more hours.     · You are dizzy or lightheaded, or you feel like you may faint.     · You have a fever and pelvic pain or vaginal discharge.     · You have pelvic pain that is getting worse. Watch closely for changes in your health, and be sure to contact your doctor if:    · You cannot feel the string, or the IUD comes out.     · You feel sick to your stomach, or you vomit.     · You think you may be pregnant. Where can you learn more? Go to https://Confluence [email or web address] org and sign in to your Kojami account. Enter E538 in the [facility] box to learn more about \"Intrauterine Device (IUD) Insertion: Care Instructions. \"     If you do not have an account, please click on the \"Sign Up Now\" link. Current as of: October 8, 2020               Content Version: 12.9  © 5099-7071 [employer]. Care instructions adapted under license by [facility] ([facility]). If you have questions about a medical condition or this instruction, always ask your healthcare professional. [place] [address] any warranty or liability for your use of this information. Patient Education        IUD Removal: Care Instructions  Your Care Instructions     The intrauterine device (IUD) is a method of birth control. It is a small, plastic, T-shaped device that contains copper or hormones. It is placed in your uterus.  You may have Care instructions adapted under license by [facility] ([facility]). If you have questions about a medical condition or this instruction, always ask your healthcare professional. [address] any warranty or liability for your use of this information.

## 2021-09-24 ENCOUNTER — TELEPHONE (OUTPATIENT)
Dept: OBGYN CLINIC | Age: 26
End: 2021-09-24

## 2021-09-24 NOTE — TELEPHONE ENCOUNTER
Macie Vega with [facility] is calling regarding insurance rejection for patient's IUD. A prior authorization is needed please contact [phone] to obtain auth.

## 2021-09-28 ENCOUNTER — PATIENT MESSAGE (OUTPATIENT)
Dept: OBGYN CLINIC | Age: 26
End: 2021-09-28

## 2021-09-28 DIAGNOSIS — Z11.51 SCREENING FOR HUMAN PAPILLOMAVIRUS: ICD-10-CM

## 2021-09-28 DIAGNOSIS — Z01.419 WOMEN'S ANNUAL ROUTINE GYNECOLOGICAL EXAMINATION: ICD-10-CM

## 2021-09-28 DIAGNOSIS — Z11.3 ROUTINE SCREENING FOR STI (SEXUALLY TRANSMITTED INFECTION): ICD-10-CM

## 2021-09-28 RX ORDER — METRONIDAZOLE 500 MG/1
500 TABLET ORAL 2 TIMES DAILY
Qty: 14 TABLET | Refills: 0 | Status: SHIPPED | OUTPATIENT
Start: 2021-09-28 | End: 2021-10-05

## 2021-09-28 NOTE — TELEPHONE ENCOUNTER
From: Rosa Jacob  To: Greta Shin MD  Sent: 9/28/2021 1:33 PM EDT  Subject: Test Results Question    Will I need an antibiotic due to the test result from my pap? I feel like everytime I have a pelvic exam/pap I have bacteria vaginosis.

## 2021-09-29 ENCOUNTER — TELEPHONE (OUTPATIENT)
Dept: OBGYN CLINIC | Age: 26
End: 2021-09-29

## 2021-09-29 NOTE — TELEPHONE ENCOUNTER
[facility] Tiffany uriarte calling to inform office that a PA will need to be completed for pt's mirena IUD. Please call phone # [phone] to start PA. [facility] will like a call back if approved or denied at # [phone]. No joint pain, swelling or deformity; no limitation of movement negative

## 2021-09-30 ENCOUNTER — VIRTUAL VISIT (OUTPATIENT)
Dept: INTERNAL MEDICINE | Age: 26
End: 2021-09-30
Payer: MEDICARE

## 2021-09-30 DIAGNOSIS — Z20.822 EXPOSURE TO CONFIRMED CASE OF COVID-19: ICD-10-CM

## 2021-09-30 DIAGNOSIS — J06.9 VIRAL URI: ICD-10-CM

## 2021-09-30 DIAGNOSIS — R05.9 COUGH: Primary | ICD-10-CM

## 2021-09-30 LAB
Lab: NORMAL
PERFORMING INSTRUMENT: NORMAL
QC PASS/FAIL: NORMAL
SARS-COV-2, POC: NORMAL

## 2021-09-30 PROCEDURE — 99213 OFFICE O/P EST LOW 20 MIN: CPT | Performed by: NURSE PRACTITIONER

## 2021-09-30 PROCEDURE — 87426 SARSCOV CORONAVIRUS AG IA: CPT | Performed by: NURSE PRACTITIONER

## 2021-09-30 ASSESSMENT — ENCOUNTER SYMPTOMS
PHOTOPHOBIA: 0
WHEEZING: 0
EYES NEGATIVE: 1
SORE THROAT: 0
COUGH: 1
NAUSEA: 0
ALLERGIC/IMMUNOLOGIC NEGATIVE: 1
RHINORRHEA: 0
SHORTNESS OF BREATH: 0
ABDOMINAL PAIN: 0
BACK PAIN: 0
VOMITING: 0

## 2021-09-30 NOTE — LETTER
NOTIFICATION RETURN TO WORK / SCHOOL    9/30/2021    Ms. [address] [address]      To Whom It May Concern:    Lizzy Garcia was tested for COVID-19 on 9/30/2021, and the result was negative. She may return to work tomorrow. I recommend:return without restrictions    If there are questions or concerns, please have the patient contact our office.         Sincerely,      Lucas Steele LPN

## 2021-09-30 NOTE — PROGRESS NOTES
Lizzy Garcia (:  1995) is a 22 y.o. female,Established patient, here for evaluation of the following chief complaint(s): Concern For COVID-19 (Exposed friday, has been tested for COVID )         ASSESSMENT/PLAN:  1. Cough  -     POCT COVID-19, Antigen  2. Exposure to confirmed case of COVID-19  3. Viral URI      No follow-ups on file. Patient was advised to isolate at home for the next 10 days (or until you receive a negative Covid result) and restrict activities outside their home, except for medical care. Patient also encouraged to:    -Separate themselves from other people and animals in their home   -Avoid sharing personal items  -Wear a facemask or, if possible, separate yourself from others in the house   -Cover cough/sneezes   -Wash hands frequently   -Clean all \"high touch\" surfaces daily    Should symptoms become worse, call PCP prior to visit and inform them that you have or are suspected of having COVID-19. Call 911 for medical emergency for severe or worsening symptoms that cannot be managed at home. -  No antibiotics indicated at this time due to URIs being viral.  -  Advised to use Flonase for nasal congestion and postnasal drip. -  For sore throat you can use Cepacol throat drops which have benzocaine in them  -  For cough use the prescribed medication. Otherwise, over-the-counter cold and cough medication unless contraindicated. -  Tylenol and ibuprofen as needed for pain and fever. Ibuprofen also works well as an anti-inflammatory to reduce your symptoms.  -  Follow-up with your PCP in 7 to 14 days if no improvement. -  Otherwise, for severe shortness of breath, chest pain, or high fever unrelieved with antipyretics then get to the ER. SUBJECTIVE/OBJECTIVE:  Patient presents with primary concern for Covid exposure 1 week ago. Patient has been experiencing headache, cough, congestion.   Patient denies fever, shortness of breath, chest pain, loss of taste or smell, nausea, vomiting, or diarrhea. Patient requesting Covid testing in order to return to work. Review of Systems   Constitutional: Negative for appetite change, fatigue, fever and unexpected weight change. HENT: Positive for congestion. Negative for rhinorrhea and sore throat. Eyes: Negative. Negative for photophobia and visual disturbance. Respiratory: Positive for cough. Negative for shortness of breath and wheezing. Cardiovascular: Negative for chest pain. Gastrointestinal: Negative for abdominal pain, nausea and vomiting. Endocrine: Negative. Negative for polydipsia, polyphagia and polyuria. Genitourinary: Negative for difficulty urinating, dysuria and pelvic pain. Musculoskeletal: Negative for back pain. Skin: Negative. Negative for rash. Allergic/Immunologic: Negative. Neurological: Positive for headaches. Negative for dizziness, speech difficulty and weakness. Hematological: Negative. Psychiatric/Behavioral: Negative. Negative for behavioral problems and confusion. Patient-Reported Vitals 11/4/2020   Patient-Reported Weight 298.8lbs        Physical Exam    No PE due to virtual visit    On this date 9/30/2021 I have spent 20 minutes reviewing previous notes, test results and face to face (virtual) with the patient discussing the diagnosis and importance of compliance with the treatment plan as well as documenting on the day of the visit. Joel Simons, was evaluated through a synchronous (real-time) audio-video encounter. The patient (or guardian if applicable) is aware that this is a billable service. Verbal consent to proceed has been obtained within the past 12 months. The visit was conducted pursuant to the emergency declaration under the [address] [place] [address] and the [employer] and [employer] General Act.   Patient identification was verified, and a caregiver was present when appropriate. The patient was located in a state where the provider was credentialed to provide care. An electronic signature was used to authenticate this note.     --VIET oMrris - CNP

## 2021-09-30 NOTE — PATIENT INSTRUCTIONS
Patient Education        Learning About Coronavirus [phone])  What is coronavirus (COVID-19)? COVID-19 is a disease caused by a type of coronavirus. This illness was first found in December 2019. It has since spread worldwide. Coronaviruses are a large group of viruses. They cause the common cold. They also cause more serious illnesses like Middle East respiratory syndrome (MERS) and severe acute respiratory syndrome (SARS). COVID-19 is caused by a novel coronavirus. That means it's a new type that has not been seen in people before. What are the symptoms? COVID-19 symptoms may include:  · Fever. · Cough. · Trouble breathing. · Chills or repeated shaking with chills. · Muscle and body aches. · Headache. · Sore throat. · New loss of taste or smell. · Vomiting. · Diarrhea. In severe cases, COVID-19 can cause pneumonia and make it hard to breathe without help from a machine. It can cause death. How is it diagnosed? COVID-19 is diagnosed with a viral test. This may also be called a PCR test or antigen test. It looks for evidence of the virus in your breathing passages or lungs (respiratory system). The test is most often done on a sample from the nose, throat, or lungs. It's sometimes done on a sample of saliva. One way a sample is collected is by putting a long swab into the back of your nose. How is it treated? Mild cases of COVID-19 can be treated at home. Serious cases need treatment in the hospital. Treatment may include medicines to reduce symptoms, plus breathing support such as oxygen therapy or a ventilator. Some people may be placed on their belly to help their oxygen levels. Treatments that may help people who have COVID-19 include:  Antiviral medicines. These medicines treat viral infections. Remdesivir is an example. Immune-based therapy. These medicines help the immune system fight COVID-19. Examples include monoclonal antibodies. Blood thinners.    These medicines help prevent blood clots. People with severe illness are at risk for blood clots. How can you protect yourself and others? The best way to protect yourself from getting sick is to:  · Get vaccinated. · Avoid sick people. · If you are not fully vaccinated:  ? Wear a mask if you have to go to public areas. ? Avoid crowds and try to stay at least 6 feet away from other people. · Cover your mouth with a tissue when you cough or sneeze. · Wash your hands often, especially after you cough or sneeze. Use soap and water, and scrub for at least 20 seconds. If soap and water aren't available, use an alcohol-based hand . · Avoid touching your mouth, nose, and eyes. To help avoid spreading the virus to others:  · Get vaccinated. · Cover your mouth with a tissue when you cough or sneeze. · Wash your hands often, especially after you cough or sneeze. Use soap and water, and scrub for at least 20 seconds. If soap and water aren't available, use an alcohol-based hand . · If you have been exposed to the virus and are not fully vaccinated:  ? Stay home. Don't go to school, work, or public areas. And don't use public transportation, ride-shares, or taxis unless you have no choice. ? Wear a mask if you have to go to public areas, like the pharmacy. · If you're sick:  ? Leave your home only if you need to get medical care. But call the doctor's office first so they know you're coming. And wear a mask. ? Wear a mask whenever you're around other people. ? Limit contact with pets and people in your home. If possible, stay in a separate bedroom and use a separate bathroom. ? Clean and disinfect your home every day. Use household  and disinfectant wipes or sprays. Take special care to clean things that you touch with your hands. How can you self-isolate when you have COVID-19? If you have COVID-19, there are things you can do to help avoid spreading the virus to others.   · Limit contact with people in your home. If possible, stay in a separate bedroom and use a separate bathroom. · Wear a mask when you are around other people. · If you have to leave home, avoid crowds and try to stay at least 6 feet away from other people. · Avoid contact with pets and other animals. · Cover your mouth and nose with a tissue when you cough or sneeze. Then throw it in the trash right away. · Wash your hands often, especially after you cough or sneeze. Use soap and water, and scrub for at least 20 seconds. If soap and water aren't available, use an alcohol-based hand . · Don't share personal household items. These include bedding, towels, cups and glasses, and eating utensils. · [address] in the warmest water allowed for the fabric type, and dry it completely. It's okay to wash other people's laundry with yours. · Clean and disinfect your home. Use household  and disinfectant wipes or sprays. When should you call for help? Call 911 anytime you think you may need emergency care. For example, call if you have life-threatening symptoms, such as:    · You have severe trouble breathing. (You can't talk at all.)     · You have constant chest pain or pressure.     · You are severely dizzy or lightheaded.     · You are confused or can't think clearly.     · You have pale, gray, or blue-colored skin or lips.     · You pass out (lose consciousness) or are very hard to wake up. Call your doctor now or seek immediate medical care if:    · You have moderate trouble breathing. (You can't speak a full sentence.)     · You are coughing up blood (more than about 1 teaspoon).     · You have signs of low blood pressure. These include feeling lightheaded; being too weak to stand; and having cold, pale, clammy skin.    Watch closely for changes in your health, and be sure to contact your doctor if:    · Your symptoms get worse.     · You are not getting better as expected.     · You have new or worse symptoms of anxiety, depression, nightmares, or flashbacks. Call before you go to the doctor's office. Follow their instructions. And wear a mask. Current as of: July 1, 2021               Content Version: 13.0  © 2006-2021 [employer]. Care instructions adapted under license by [facility] ([facility]). If you have questions about a medical condition or this instruction, always ask your healthcare professional. [place] [address] any warranty or liability for your use of this information.

## 2021-11-09 ENCOUNTER — TELEPHONE (OUTPATIENT)
Dept: OBGYN CLINIC | Age: 26
End: 2021-11-09

## 2022-01-05 ENCOUNTER — VIRTUAL VISIT (OUTPATIENT)
Dept: INTERNAL MEDICINE | Age: 27
End: 2022-01-05
Payer: COMMERCIAL

## 2022-01-05 DIAGNOSIS — H92.02 ACUTE EAR PAIN, LEFT: Primary | ICD-10-CM

## 2022-01-05 PROCEDURE — 99213 OFFICE O/P EST LOW 20 MIN: CPT | Performed by: NURSE PRACTITIONER

## 2022-01-05 RX ORDER — MELATONIN
COMMUNITY
Start: 2021-11-05 | End: 2022-01-20 | Stop reason: ALTCHOICE

## 2022-01-05 RX ORDER — CYCLOBENZAPRINE HCL 10 MG
TABLET ORAL
COMMUNITY
Start: 2021-12-08 | End: 2022-03-07

## 2022-01-05 RX ORDER — AMOXICILLIN AND CLAVULANATE POTASSIUM 875; 125 MG/1; MG/1
1 TABLET, FILM COATED ORAL 2 TIMES DAILY
Qty: 20 TABLET | Refills: 0 | Status: SHIPPED | OUTPATIENT
Start: 2022-01-05 | End: 2022-01-15

## 2022-01-05 ASSESSMENT — PATIENT HEALTH QUESTIONNAIRE - PHQ9
SUM OF ALL RESPONSES TO PHQ QUESTIONS 1-9: 0
3. TROUBLE FALLING OR STAYING ASLEEP: 0
10. IF YOU CHECKED OFF ANY PROBLEMS, HOW DIFFICULT HAVE THESE PROBLEMS MADE IT FOR YOU TO DO YOUR WORK, TAKE CARE OF THINGS AT HOME, OR GET ALONG WITH OTHER PEOPLE: 0
5. POOR APPETITE OR OVEREATING: 0
6. FEELING BAD ABOUT YOURSELF - OR THAT YOU ARE A FAILURE OR HAVE LET YOURSELF OR YOUR FAMILY DOWN: 0
7. TROUBLE CONCENTRATING ON THINGS, SUCH AS READING THE NEWSPAPER OR WATCHING TELEVISION: 0
8. MOVING OR SPEAKING SO SLOWLY THAT OTHER PEOPLE COULD HAVE NOTICED. OR THE OPPOSITE, BEING SO FIGETY OR RESTLESS THAT YOU HAVE BEEN MOVING AROUND A LOT MORE THAN USUAL: 0
2. FEELING DOWN, DEPRESSED OR HOPELESS: 0
SUM OF ALL RESPONSES TO PHQ QUESTIONS 1-9: 0
SUM OF ALL RESPONSES TO PHQ QUESTIONS 1-9: 0
4. FEELING TIRED OR HAVING LITTLE ENERGY: 0
SUM OF ALL RESPONSES TO PHQ QUESTIONS 1-9: 0
9. THOUGHTS THAT YOU WOULD BE BETTER OFF DEAD, OR OF HURTING YOURSELF: 0

## 2022-01-05 ASSESSMENT — ENCOUNTER SYMPTOMS
ABDOMINAL PAIN: 0
NAUSEA: 0
FACIAL SWELLING: 1
VOMITING: 0
SHORTNESS OF BREATH: 0
TROUBLE SWALLOWING: 0
SINUS PAIN: 0
EYE ITCHING: 0
SORE THROAT: 0
DIARRHEA: 0
EYE DISCHARGE: 0
CONSTIPATION: 0
COUGH: 0

## 2022-01-05 NOTE — PROGRESS NOTES
Forest John (: 1995) is a 32 y.o. female, Established patient, here for evaluation of the following chief complaint(s):  Otalgia (poss cyst to L ear )      PCP:  VIET Lemos - CNP      Pt here today for tenderness to front of her left ear. Her tooth has been giving her an issue for like 4 or 5 days. Was just at dentist not too long ago and had deep cleaning done, had braces on for 9 years which are off now. Did XRs of teeth about 1 month ago, no cavities or abscess. Yesterday felt almost like getting an ear infection. Also explains that it feels almost like a pimple inside her ear. Noticed that the area in front of her ear is hard and tender which is different when compared to other side. Denies any cough, congestion, or other respiratory issues. Has hx of numerous ear infections growing up \"my ears are all scarred inside the doctors always tell me\". Last time had one was maybe a year ago. Last time on antibiotics was for UTI in April.        Social History     Socioeconomic History    Marital status:      Spouse name: Not on file    Number of children: Not on file    Years of education: Not on file    Highest education level: Not on file   Occupational History    Not on file   Tobacco Use    Smoking status: Former Smoker     Packs/day: 0.25     Years: 4.00     Pack years: 1.00     Types: Cigarettes     Quit date: 2017     Years since quittin.3    Smokeless tobacco: Never Used    Tobacco comment: Vapes \"Occasionally\"   Vaping Use    Vaping Use: Former   Substance and Sexual Activity    Alcohol use: Not Currently     Comment: SOCIALLY WEEKENDS    Drug use: Never    Sexual activity: Yes     Partners: Male     Birth control/protection: Inserts     Comment: Nuvaring   Other Topics Concern    Not on file   Social History Narrative    ** Merged History Encounter **          Social Determinants of Health     Financial Resource Strain: Low Risk     Difficulty of Paying Living Expenses: Not hard at all   Food Insecurity: No Food Insecurity    Worried About [facility] in the Last Year: Never true    Ran Out of Food in the Last Year: Never true   Transportation Needs: No Transportation Needs    Lack of Transportation (Medical): No    Lack of Transportation (Non-Medical): No   Physical Activity:     Days of Exercise per Week: Not on file    Minutes of Exercise per Session: Not on file   Stress:     Feeling of Stress : Not on file   Social Connections:     Frequency of Communication with Friends and Family: Not on file    Frequency of Social Gatherings with Friends and Family: Not on file    Attends Spiritism Services: Not on file    Active Member of [address] [place] [address] or Organizations: Not on file    Attends Club or Organization Meetings: Not on file    Marital Status: Not on file   Intimate Partner Violence:     Fear of Current or Ex-Partner: Not on file    Emotionally Abused: Not on file    Physically Abused: Not on file    Sexually Abused: Not on file   Housing Stability:     Unable to Pay for Housing in the Last Year: Not on file    Number of [place] in the Last Year: Not on file    Unstable Housing in the Last Year: Not on file       Review of Systems   Constitutional: Negative for chills, diaphoresis, fatigue and fever. HENT: Positive for dental problem, ear pain and facial swelling. Negative for congestion, ear discharge, postnasal drip, sinus pain, sore throat, tinnitus and trouble swallowing. Eyes: Negative for discharge and itching. Respiratory: Negative for cough and shortness of breath. Cardiovascular: Negative for chest pain. Gastrointestinal: Negative for abdominal pain, constipation, diarrhea, nausea and vomiting. Musculoskeletal: Negative for myalgias, neck pain and neck stiffness. Neurological: Negative for headaches. Hematological: Positive for adenopathy.        I have reviewed the patient's medical history in detail and updated the computerized patient record. OBJECTIVE    There were no vitals filed for this visit. Physical Exam  Vitals and nursing note reviewed. Constitutional:       General: She is not in acute distress. Appearance: Normal appearance. HENT:      Head: Normocephalic and atraumatic. Right Ear: External ear normal.      Left Ear: No decreased hearing noted. Swelling and tenderness present. No drainage. Ears:      Comments: Mild swelling and pt reports firmness noted over preauricular area of left ear. No redness seen, she denies feeling a lymph node. Denies any radiation into neck or jaw     Mouth/Throat:      Lips: Pink. Mouth: Mucous membranes are moist.      Dentition: Normal dentition. No dental tenderness. Pulmonary:      Effort: Pulmonary effort is normal. No respiratory distress. Musculoskeletal:      Cervical back: Normal range of motion and neck supple. Lymphadenopathy:      Cervical: No cervical adenopathy. Skin:     General: Skin is warm and dry. Neurological:      Mental Status: She is alert and oriented to person, place, and time. Psychiatric:         Mood and Affect: Mood normal.         Thought Content: Thought content normal.               ASSESSMENT/ PLAN    1. Acute ear pain, left  -new  -will treat for suspected ear infection given her history but could be dental issue  -will give augmentin as will treat both issues  -if doesn't improve, must be seen in person for evaluation of internal ear and teeth better  -tylenol for pain  -ice and heat to area  - amoxicillin-clavulanate (AUGMENTIN) 875-125 MG per tablet; Take 1 tablet by mouth 2 times daily for 10 days  Dispense: 20 tablet; Refill: 0            On this date 1/5/2022 I have spent 15 minutes reviewing previous notes, test results and face to face with the patient discussing the diagnosis and importance of compliance with the treatment plan as well as documenting on the day of the visit.     No follow-ups on file. Marcus Anguiano was evaluated through a synchronous (real-time) audio-video encounter. The patient (or guardian if applicable) is aware that this is a billable service. Verbal consent to proceed has been obtained within the past 12 months. The visit was conducted pursuant to the emergency declaration under the [address] [place] [address] and the [employer] General Act. Patient identification was verified, and a caregiver was present when appropriate. The patient was located in a state where the provider was credentialed to provide care.     Electronically signed by:  VIET Wilcox CNP   1/5/22

## 2022-01-05 NOTE — PATIENT INSTRUCTIONS
-Can alternate ice and heat to left ear area. -Tylenol for pain relief  -Take augmentin twice a day for 10 days. Take with food.  Eat yogurt for good probiotics while on antibiotics.   -If no improvement f/u in person for evaluation

## 2022-01-17 RX ORDER — FLUCONAZOLE 150 MG/1
150 TABLET ORAL WEEKLY
Qty: 3 TABLET | Refills: 0 | Status: SHIPPED | OUTPATIENT
Start: 2022-01-17 | End: 2022-01-20 | Stop reason: ALTCHOICE

## 2022-03-07 ENCOUNTER — OFFICE VISIT (OUTPATIENT)
Dept: FAMILY MEDICINE CLINIC | Age: 27
End: 2022-03-07
Payer: MEDICARE

## 2022-03-07 VITALS
WEIGHT: 293 LBS | TEMPERATURE: 97.1 F | HEIGHT: 59 IN | OXYGEN SATURATION: 97 % | HEART RATE: 110 BPM | BODY MASS INDEX: 59.07 KG/M2 | SYSTOLIC BLOOD PRESSURE: 124 MMHG | DIASTOLIC BLOOD PRESSURE: 80 MMHG

## 2022-03-07 DIAGNOSIS — Z71.3 NUTRITIONAL COUNSELING: ICD-10-CM

## 2022-03-07 DIAGNOSIS — F41.9 ANXIETY: Primary | ICD-10-CM

## 2022-03-07 DIAGNOSIS — R06.02 SOB (SHORTNESS OF BREATH): ICD-10-CM

## 2022-03-07 DIAGNOSIS — F43.10 PTSD (POST-TRAUMATIC STRESS DISORDER): ICD-10-CM

## 2022-03-07 DIAGNOSIS — R06.2 WHEEZING: ICD-10-CM

## 2022-03-07 PROCEDURE — 99214 OFFICE O/P EST MOD 30 MIN: CPT | Performed by: FAMILY MEDICINE

## 2022-03-07 RX ORDER — ALBUTEROL SULFATE 90 UG/1
2 AEROSOL, METERED RESPIRATORY (INHALATION) 4 TIMES DAILY PRN
Qty: 18 G | Refills: 1 | Status: SHIPPED | OUTPATIENT
Start: 2022-03-07 | End: 2022-06-13

## 2022-03-07 RX ORDER — FLUOXETINE HYDROCHLORIDE 20 MG/1
20 CAPSULE ORAL DAILY
Qty: 30 CAPSULE | Refills: 3 | Status: SHIPPED | OUTPATIENT
Start: 2022-03-07 | End: 2022-10-13 | Stop reason: ALTCHOICE

## 2022-03-07 RX ORDER — MELATONIN
COMMUNITY
Start: 2022-02-08

## 2022-03-07 RX ORDER — PHENTERMINE HYDROCHLORIDE 37.5 MG/1
37.5 TABLET ORAL
Qty: 30 TABLET | Refills: 0 | Status: SHIPPED | OUTPATIENT
Start: 2022-03-07 | End: 2022-04-06

## 2022-03-07 ASSESSMENT — ENCOUNTER SYMPTOMS
COUGH: 0
DIARRHEA: 0
RHINORRHEA: 0
CONSTIPATION: 0
SHORTNESS OF BREATH: 0
ABDOMINAL PAIN: 0
SORE THROAT: 0
WHEEZING: 0

## 2022-03-07 NOTE — PROGRESS NOTES
[address] [facility] [address] [facility]  [phone] [id number]  Dept: [phone]  Dept Fax: [phone]  Loc: [phone]     Chief Complaint  Chief Complaint   Patient presents with    Discuss Medications     inhaler    Nutrition Counseling     would like to try adipex    Anxiety       HPI:  32 y. o.female who presents for the following: Wt management: Body mass index is 67.46 kg/m². 334 lbs    Had been seeing bariatric and got nervous about surgery so now just wants to do this without surgery. Was talking to a nutritionist during that time; emotional eater; quit job as as  and now works at a car dealership    Breakfast: protein shake  Lunch: still figuring out  [address]: [employer] in a crock pot; lower carb, added veggies  Snacks: mini sweet peppers and other veggies  Drinks: water  Activity: [employer] (3 days/week for 30min with treadmill/bike)     Mood: anxiety and PTSD regarding ex-    Resp: thinks had asthma as a child; had COVID last fall and since that time has gotten some intermittent SOB and wheezing quickly resolved with albuterol. Review of Systems   Constitutional: Negative for chills and fever. HENT: Negative for congestion, rhinorrhea and sore throat. Respiratory: Negative for cough, shortness of breath and wheezing. Gastrointestinal: Negative for abdominal pain, constipation and diarrhea. Endocrine: Negative for polydipsia and polyuria. Genitourinary: Negative for dysuria, frequency and urgency. Neurological: Negative for syncope, light-headedness, numbness and headaches. Psychiatric/Behavioral: Negative for sleep disturbance. The patient is not nervous/anxious.         Past Medical History:   Diagnosis Date    Abnormal uterine bleeding 11/27/2018    Anxiety     Asthma     Back pain     Blurred vision     BMI 50.0-59.9, adult (HCC)     Chronic back pain     Social History Narrative    ** Merged History Encounter **          Social Determinants of Health     Financial Resource Strain: Low Risk     Difficulty of Paying Living Expenses: Not hard at all   Food Insecurity: No Food Insecurity    Worried About Running Out of Food in the Last Year: Never true    [place]ola of Food in the Last Year: Never true   Transportation Needs: No Transportation Needs    Lack of Transportation (Medical): No    Lack of Transportation (Non-Medical):  No   Physical Activity:     Days of Exercise per Week: Not on file    Minutes of Exercise per Session: Not on file   Stress:     Feeling of Stress : Not on file   Social Connections:     Frequency of Communication with Friends and Family: Not on file    Frequency of Social Gatherings with Friends and Family: Not on file    Attends Worship Services: Not on file    Active Member of Clubs or Organizations: Not on file    Attends Club or Organization Meetings: Not on file    Marital Status: Not on file   Intimate Partner Violence:     Fear of Current or Ex-Partner: Not on file    Emotionally Abused: Not on file    Physically Abused: Not on file    Sexually Abused: Not on file   Housing Stability:     Unable to Pay for Housing in the Last Year: Not on file    Number of [place] in the Last Year: Not on file    Unstable Housing in the Last Year: Not on file     Family History   Problem Relation Age of Onset    Diabetes Mother     High Cholesterol Mother     Miscarriages / [place] Mother     Arthritis Mother     Obesity Mother     Hypertension Mother     Stroke Mother     Allergy (Severe) Father     Obesity Father     High Blood Pressure Sister     Obesity Sister     Breast Cancer Maternal Aunt     Breast Cancer Maternal Grandmother     Cancer Maternal Grandmother         breast, unknown age of dx    Obesity Maternal Grandmother     Diabetes Maternal Grandmother     Hypertension Maternal Grandmother     Stroke Maternal Grandmother     Prostate Cancer Maternal Grandfather     Heart Disease Maternal Grandfather     Allergy (Severe) Paternal Grandmother     Arthritis Paternal Grandmother     Depression Paternal Grandmother     Diabetes Paternal Grandmother     Hypertension Paternal Grandmother     Other Paternal Grandmother         blood clot    Allergy (Severe) Paternal Grandfather     Arthritis Paternal Grandfather     Asthma Paternal Grandfather     Diabetes Paternal Grandfather     Heart Attack Paternal Grandfather     Heart Disease Paternal Grandfather     High Blood Pressure Paternal Grandfather     High Cholesterol Paternal Grandfather     Kidney Disease Paternal Grandfather     Osteoporosis Paternal Grandfather     Substance Abuse Paternal Grandfather     Obesity Paternal Grandfather     Stroke Paternal Grandfather     High Blood Pressure Sister     Cancer Maternal Aunt         breast, unknown age of dx    Cancer Maternal Aunt         breast, unknown age of dx      Allergies   Allergen Reactions    Motrin [Ibuprofen] Hives and Swelling    Pineapple Hives    Fetzima [Levomilnacipran] Nausea And Vomiting     Current Outpatient Medications   Medication Sig Dispense Refill    vitamin D3 (CHOLECALCIFEROL) 25 MCG (1000 UT) TABS tablet       ixekizumab (TALTZ) 80 MG/ML SOAJ prefilled syringe Inject into the skin      albuterol sulfate HFA (VENTOLIN HFA) 108 (90 Base) MCG/ACT inhaler Inhale 2 puffs into the lungs 4 times daily as needed for Wheezing or Shortness of Breath 18 g 1    FLUoxetine (PROZAC) 20 MG capsule Take 1 capsule by mouth daily 30 capsule 3    phentermine (ADIPEX-P) 37.5 MG tablet Take 1 tablet by mouth every morning (before breakfast) for 30 days.  30 tablet 0    metFORMIN (GLUCOPHAGE) 500 MG tablet Take 500 mg by mouth 2 times daily (with meals) Indications: Diabetes       Blood Glucose Monitoring Suppl (ONE TOUCH ULTRA 2) w/Device KIT       Blood Glucose Monitoring Suppl (ONE TOUCH ULTRA MINI) w/Device KIT 1 each by NOT APPLICABLE route daily      ONETOUCH ULTRA strip       cetirizine (ZYRTEC) 10 MG tablet Take 10 mg by mouth daily Indications: Seasonal Allergy       etonogestrel-ethinyl estradiol (NUVARING) 0.12-0.015 MG/24HR vaginal ring Place 1 each vaginally every 21 days Insert one (1) ring vaginally and leave in place for three (3) weeks, then remove for one (1) week. 3 each 3     No current facility-administered medications for this visit. Vitals:    03/07/22 1307   BP: 124/80   Pulse: 110   Temp: 97.1 °F (36.2 °C)   TempSrc: Infrared   SpO2: 97%   Weight: (!) 334 lb (151.5 kg)   Height: 4' 11\" (1.499 m)       Physical exam:  Physical Exam  Vitals reviewed. Constitutional:       General: She is not in acute distress. Appearance: She is well-developed. HENT:      Head: Normocephalic and atraumatic. Right Ear: Tympanic membrane, ear canal and external ear normal. Tympanic membrane is not erythematous. Tympanic membrane has normal mobility. Left Ear: Tympanic membrane, ear canal and external ear normal. Tympanic membrane is not erythematous. Tympanic membrane has normal mobility. Nose: Nose normal.      Mouth/Throat:      Pharynx: No oropharyngeal exudate. Neck:      Thyroid: No thyromegaly. Cardiovascular:      Rate and Rhythm: Normal rate and regular rhythm. Heart sounds: Normal heart sounds. No murmur heard. Pulmonary:      Effort: Pulmonary effort is normal. No respiratory distress. Breath sounds: Normal breath sounds. No wheezing. Abdominal:      General: Bowel sounds are normal. There is no distension. Palpations: Abdomen is soft. Tenderness: There is no abdominal tenderness. There is no guarding or rebound. Musculoskeletal:      Cervical back: Normal range of motion. Lymphadenopathy:      Cervical: No cervical adenopathy. Skin:     General: Skin is warm and dry.    Neurological:      Mental Status: She is alert and oriented to person, place, and time. Psychiatric:         Behavior: Behavior normal.         Assessment/Plan:  32 y.o. female here mainly for wt management:  - Wt: starting month 1 adipex; discussed lifestyle changes  - Resp: offered PFTs; she prefers albuterol inhaler prn for now  - Mood: starting prozac and sending for counseling     Diagnosis Orders   1. Dylon King, PhD, Psychology, [facility]    FLUoxetine (PROZAC) 20 MG capsule   2. SOB (shortness of breath)  albuterol sulfate HFA (VENTOLIN HFA) 108 (90 Base) MCG/ACT inhaler   3. Wheezing  albuterol sulfate HFA (VENTOLIN HFA) 108 (90 Base) MCG/ACT inhaler   4. PTSD (post-traumatic stress disorder)  Malik Odonnell, PhD, Psychology, [facility]    FLUoxetine (PROZAC) 20 MG capsule   5. Nutritional counseling  phentermine (ADIPEX-P) 37.5 MG tablet        Return in about 1 month (around 4/7/2022) for weight management.     Eric Brownlee MD

## 2022-03-16 ENCOUNTER — TELEPHONE (OUTPATIENT)
Dept: OBGYN CLINIC | Age: 27
End: 2022-03-16

## 2022-03-16 NOTE — TELEPHONE ENCOUNTER
LMOM for pt to contact office regarding the [address]. It patient decides she declines insertion then the device shall be returned.

## 2022-03-21 ENCOUNTER — TELEMEDICINE (OUTPATIENT)
Dept: BEHAVIORAL/MENTAL HEALTH CLINIC | Age: 27
End: 2022-03-21
Payer: MEDICARE

## 2022-03-21 DIAGNOSIS — E66.01 MORBID OBESITY DUE TO EXCESS CALORIES (HCC): ICD-10-CM

## 2022-03-21 DIAGNOSIS — F43.10 PTSD (POST-TRAUMATIC STRESS DISORDER): Primary | ICD-10-CM

## 2022-03-21 PROCEDURE — 90791 PSYCH DIAGNOSTIC EVALUATION: CPT | Performed by: PSYCHOLOGIST

## 2022-03-21 ASSESSMENT — PATIENT HEALTH QUESTIONNAIRE - PHQ9
SUM OF ALL RESPONSES TO PHQ QUESTIONS 1-9: 13
5. POOR APPETITE OR OVEREATING: 2
9. THOUGHTS THAT YOU WOULD BE BETTER OFF DEAD, OR OF HURTING YOURSELF: 0
4. FEELING TIRED OR HAVING LITTLE ENERGY: 2
1. LITTLE INTEREST OR PLEASURE IN DOING THINGS: 3
2. FEELING DOWN, DEPRESSED OR HOPELESS: 2
SUM OF ALL RESPONSES TO PHQ QUESTIONS 1-9: 13
SUM OF ALL RESPONSES TO PHQ QUESTIONS 1-9: 13
8. MOVING OR SPEAKING SO SLOWLY THAT OTHER PEOPLE COULD HAVE NOTICED. OR THE OPPOSITE, BEING SO FIGETY OR RESTLESS THAT YOU HAVE BEEN MOVING AROUND A LOT MORE THAN USUAL: 0
7. TROUBLE CONCENTRATING ON THINGS, SUCH AS READING THE NEWSPAPER OR WATCHING TELEVISION: 1
SUM OF ALL RESPONSES TO PHQ9 QUESTIONS 1 & 2: 5
SUM OF ALL RESPONSES TO PHQ QUESTIONS 1-9: 13
10. IF YOU CHECKED OFF ANY PROBLEMS, HOW DIFFICULT HAVE THESE PROBLEMS MADE IT FOR YOU TO DO YOUR WORK, TAKE CARE OF THINGS AT HOME, OR GET ALONG WITH OTHER PEOPLE: 1
6. FEELING BAD ABOUT YOURSELF - OR THAT YOU ARE A FAILURE OR HAVE LET YOURSELF OR YOUR FAMILY DOWN: 0
3. TROUBLE FALLING OR STAYING ASLEEP: 3

## 2022-03-21 NOTE — PROGRESS NOTES
She stopped her prozac due to side effects recently. Can we offer a visit to change meds if she is interested.

## 2022-03-21 NOTE — Clinical Note
We are going to get started on anxiety work but she plans to link with specialty [facility] for trauma specific work. Pt also stopped the Prozac after three days due to severe side effects, would be interested in starting something else if you'd agree.  Thanks Ila
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
## 2022-03-21 NOTE — PATIENT INSTRUCTIONS
1. Dedicate 5 minutes 3x/day to practice the deep breathing    2. Review the list of apps and give some a try! [facility] Box, CBTi  and progressive muscle relaxation for sleep, etc.)    3. Read the below information about stress management    4. F/U as scheduled    5. Set up that specialty [place] [address] appt for the trauma work provider- I'd recommend [facility] or a community [place] [address] agency like [facility], [place]    \"The entire autonomic nervous system (and through it, our internal organs and glands) is largely driven by our breathing patterns. By changing our breathing we can influence millions of biochemical reactions in our body, producing more relaxing substances such as endorphins and fewer anxiety-producing ones like adrenaline and higher blood acidity. Mindfulness of the breath is so effective that it is common to all meditative and prayer traditions. \" Anxiety Fear & Breathing - Breathing. com    \"When overcoming high levels of anxiety, it is important to learn the techniques of correct breathing. Many people who live with high levels of anxiety are known to breathe through their chest. Shallow breathing through the chest means you are disrupting the balance of oxygen and carbon dioxide necessary to be in a relaxed state. This type of breathing will perpetuate the symptoms of anxiety. \" Divvyshot. com      Diaphragmatic Breathing             _____________________________________________________________________________  1. Sit in a comfortable position    2. Place one hand on your stomach and the other on your chest    3. Try to breathe so that only your stomach rises and falls    As you inhale, concentrate on your chest remaining relatively still while your stomach rises. It may be helpful for you to imagine that your pants are too big and you need to push your stomach out to hold them up.   When exhaling, allow your stomach to fall in and the air to fully escape. Inhale slowly. You may choose to hold the air in for about a second. Exhale slowly. Dont push the air out, but just let the natural pressure of your body slowly move it out. It is normal for this healthy method of breathing to feel a little awkward at first.  With practice, it will feel more natural.    4. Get your mind on your side      One other important factor in getting relaxed is your mind. Your mind and body are connected. The mind influences the body and the body influences the mind. What you do with your mind when you are trying to relax is very important. The key is to avoid thinking about stressful things. You can think about       Neutral things (e.g., counting, saying a word like calm or relax)    Pleasant things (e.g., imagining a pleasant place)     5. It is recommended that you practice 2 times per day, 10 minutes each time. ----------------------------------------------------------------------------------------------------------------------  ----------------------------------------------------------------------------------------------------------------------  ----------------------------------------------------------------------------------------------------------------------  ----------------------------------------------------------------------------------------------------------------------      CONTROLLED or MEASURED BREATHING EXERCISE: (YOU MAY WANT TO DOWNLOAD THE FREE ROGER \"VIRTUAL HOPE BOX\" TO PRACTICE THIS EXERCISE)     You can sit or stand, but be sure to soften up a little before you begin. Make sure your hands are relaxed, and your knees are soft.  Drop your shoulders and let your jaw relax.  Now breath in slowly through your nose and count to three, keep your shoulders down and allow your stomach to expand as you breathe in.   Hold the breath for a moment.  Now release your breath slowly and smoothly as you count to six.    Repeat for a couple of minutes        It can be very helpful to use tools like relaxed breathing, muscle relaxation, and guided imagery/visualization to cope with stress, pain, anxiety and depression. I recommend to all my patients that they try different techniques to find the ones that work best for them. Below are 2 websites that have several breathing, relaxation, and visualization exercises that you can listen to and download for free.    [email or web address] Technologies.[email or web address] html  · Deep Breathing & Guided Relaxation Exercises (3)  · Guided Imagery/Visualization Exercises (5)  · Mindfulness & Meditation Exercises (3)  · Progressive Muscle Relaxation   · Soothing Instrumental Music (11)    [email or web address]  · Diaphragmatic Breathing   · Deep Breathing I   · Deep Breathing II   · Progressive Muscle Relaxation   · Guided Imagery: The ImpulseFlyer   · Guided Imagery: [facility]   · Relaxing Phrases   · Just This Breath   · Increasing Awareness   · Sending Thoughts Away on Clouds  · Sending Thoughts Away on Leaves  · Sorting Into Boxes     -----------------------------------------------------  Below are several apps that you can download to your smartphone to help with relaxation and mood coping. [email or web address]  Platform: [employer] & Healthways  Cost: Free  Your breathing has a profound effect on your body. Awa Edward know this fact to be true if youve ever taken deep breaths to calm yourself down when you were upset. That exercise can often make you feel more centered, and its proof that breathing is powerful. The [id number] yuridia uses guided breathing exercises to help reduce symptoms of an anxiety attack. If an attack is coming or the symptoms are unbearable, slip away into a quiet room, open your yuridia, and let the worry and stress slip away with each breath.     Universal Breathing - Pranayama Free  Platform: [employer] & Healthways  Cost: Free  Focused breathing exercises can help you regain composure during an anxiety attack. They can also help you prevent an anxiety attack before one starts. Pranayama breathing techniques are common in yoga and have powerful benefits. If youre a beginner, you can benefit from the yuridias guided breathing instruction. Americo Kaiser learn how to breathe deeply, hold, and then release with better control. If it works for you, you can purchase the full course which gives you access to the entire program.  Breathing Zone   Platform: CopperKeyhone & Android  Cost: $3.99   Breathing Zone uses a clinically proven therapeutic breathing exercise that decreases your heart rate, and with daily use can help manage high blood pressure.    ----------------------------------------------  Self-Help for Anxiety Management (JAH)  Platform: CopperKeyhone & Android  Cost: Free  The Self-Help for Anxiety Management (JAH) yuridia from the [employer] can help you regain control of your anxiety and emotions. Tell the yuridia how youre feeling, how anxious you are, or how worried you are. Then let the yuridias self-help features walk you through some calming or relaxation practices. If you want, you can connect with a social network of other [facility] users. Dont worry, the network isnt connected to larger networks like Twitter or [employer]. Stop Panic & Anxiety Help  Platform: Android  Cost: Free  If panic and anxiety attacks have a  on your life, this yuridia might help you let them go. The Stop Panic & Anxiety Help Android yuridia uses emotion and relaxation training audio tracks to help you fight your fears and find a state of calm. When youve overcome the attack, use the yuridias journal to record what caused the attack and how you were able to get through it. Then use this journal to learn from your experiences and prepare for the future. I Can Be Fearless by Human Progress  Platform: iPhone  Cost: Free  When you were younger, your parents might have told you that you could do anything you put your mind to.  This yuridia might not help you be an astronaut or a world famous actress, but it can help you break through your anxiety, fears, and worries to a place of calm and confidence. Open your Apple device and select what you want to be right now -- calm, motivated, and confident are among the options -- then let the audio hypnosis guide you through a session. Anti-Anxiety   Platform: Android  Cost: Free  You can tell the yuridia your problems by taking a diagnostic quiz about your level of stress and anxiety. Using your answers, the yuridia will design a custom treatment plan for you. Follow instructional self-help videos like How to Tolerate and Lessen Anxiety.  Keep a daily journal of your anxiety and worries, and track your progress as you learn to regain a sense of calm. Worry Box - Anxiety Self-Help  Platform: Android  Cost: Free  Have you ever wished you could put all your worries in a box, leave them there, and walk away? The Worry Box yuridia may let you do just that. The yuridia functions a lot like a journal: Write down your thoughts, anxieties, and worries, and let the yuridia help you think them through. It will ask questions, give specific anxiety-reducing help, and can even direct you to help you reduce your worries and anxiety. It is all password protected, so you can feel safe sharing the details of your stresses. -----------------------------------------------  Relax Melodies  Platform: iPhone and Android  Cost: Free  Anxiety can disrupt healthy sleep patterns in more than one way. First, people who dont get enough sleep tend to feel more anxious. Then, people who are more anxious have a difficult time sleeping. Creating a calming environment may help you fall asleep and stay asleep. Relax to one of this yuridias 50 sounds. Need the music to stop once youre asleep? Set a timer, and it will stop playing. Set an alarm when you need to be awake. Then, enjoy the benefits of a good nights sleep, free from anxiety.         Relaxing Sounds of Janelle Azar - [employer]: Solvate  Cost: Free  You can find rest and relaxation without having to travel. The yuridia comes with 35 nature tracks, which include soothing classics like crickets chirping, breaking waves, and a serene lake. You can download more free tracks to Aisle50, and customize a favorite combination that helps you reduce your anxiety in a peaceful setting. Allow the sounds of nature to sweep you away from your worries in the comfort of your living room, office, or bedroom. Nature Sounds Relax and Sleep  Platform: Android  Cost: Free  If you find yourself longing for the sound of the ocean to help you relax, the Knottykartnifin and Sleep yuridia is for you. Open this yuridia whenever youre feeling anxious or stressed. You can select locations or sounds like the jungle, ocean, or thunder and slip away into a place of relaxation and comfort. If the sounds make you feel sleepy, even better. Use the yuridia to doze off into a relaxing slumber  Calming Music to Simplicity  Platform: Android  Cost: Free  [employer] arent the only relaxing tunes in smartphone apps. Music, especially some traditional [employer] music, can be relaxing and soothing. The Calming Music to Simplicity yuridia contains nine traditional [employer] music selections. Press play and let your worries melt away. Relax Ocean Eye-Fi Sleep by SalesGossip  Platform: Android  Cost: Free  Living far from a beach doesnt mean you have to be far from total relaxation. Slip on a set of headphones and drift into a distant sand-and-suds oasis.  Whether youre trying to head off an anxiety attack or just need to get some good sleep after a few anxious days, the Relax Ocean Waves Sleep yuridia helps you find a place of serenity.  --------------------------------------------------------------  Michel Patinole Meditation Relaxation  Platform: Android  Cost: Free  Michel Amble is a traditional [facility] health system that brings together posture, breathing, and the mind to reduce anxiety. This Android yuridia connects users with a library of relaxation videos that contain instructions for relaxing and clearing the mind. The videos are created by Dr. Justin Lopez, a psychologist with more than 20 years of experience. In addition to viewing Dr. Isabel Reece videos, you can read a variety of articles related to anxiety, meditation, and stress management. Anxiety Free  Platform: iPGlokalise   Cost: Free  Meditation requires mindfulness and a sense of presence in your thoughts. Hypnosis is one step beyond meditation. It works by sending signals to your brain and transforming it almost unknowingly. The creators of this yuridia say that its audio recordings contain subliminal signals that speak to the subconscious with powerful effect.  Hypnosis, like meditation, requires practice, and the goal is to get each user to a place where self-hypnosis is possible in order to reduce anxiety. Relax & Rest Guided Meditations  Platform: iPGlokalise and Android  Cost: $0.99  While group meetings and discussions are always an option, some people find relaxation more easily on their own. This yuridia lets you relax in the space of your own home or office with three guided meditations. Breath Awareness Guided Meditation (5 minutes), Deep Rested Guided Meditation (13 minutes), and Whole Body Guided Relaxation (24 minutes) are designed specifically to help you relax and sink into a peaceful meditation moment. Equanimity - Meditation Timer & Tracker  Platform: GreenGar   Cost: $4.99  Meditation is one way you can cope with the symptoms and side effects of anxiety. People who meditate can eventually train themselves to stay calm when feeling stressed or anxious. Equanimity - Meditation Timer & Tracker is simple and straightforward. Time each session and watch for visual light cues to let you know how long youve been meditating. Take notes about each session, and watch your progress as you learn to manage your stress and anxiety.   Virtual Hope Box  Platform: iPhone and Android  Cost: Free  The Automatic Data Box (VHB) is a smartphone application that contains simple tools to help with coping, relaxation, distraction, and positive thinking via personalized supportive audio, video, pictures, games, mindfulness exercises, positive messages and activity planning, inspirational quotes, coping statements, and other tools. Acupressure: Heal Yourself  Platform: iPhone and Android  Cost: $1.99  Acupressure is a natural healing strategy in which you target specific areas of the body in order to alleviate pain or unwanted symptoms. Acupressure can also increase blood flow, which can boost your mood and your health. This yuridia helps you find your bodys acupressure points. Apply pressure on those points when youre feeling overwhelmed, and receive the positive, calming benefit  PTSD : Self-Management of Posttraumatic Stress  Platform: iPhone and Android  Cost: Free  This yuridia can help you learn about and manage symptoms that often occur after trauma. Provides information and coping skills for common kinds of posttraumatic stress symptoms and problems, including systematic relaxation and self-help techniques. [place]: Feel better and live happier today  Platform: iPhone  Cost: Free  Daily mood and health tracking as well as journaling. Activities are based on mindfulness, relaxation and Cognitive Behavioral Therapy. Online support, networking and emails. CBT-i : Cognitive Behavioral Therapy for Insomnia  Platform: iPhone  Cost: Free  Identify sleep patterns with a sleep diary and assessment, tools to create new sleep habits, quiet your mind and prevent insomnia in the future. You can set reminders and learn about healthy sleep habits. Mindfulness   Platform: iPhone  Cost: Free  Mindfulness practice to decrease stress, manage emotional discomfort, depression, physical pain, and other problems.  It offers exercises, information, and a tracking log to that you can optimize practice. Digly  Platform: Coaxishone  Cost: Free for the first month then $5.99/month for full access  Digly is a platform to discover original content from top thought-leaders and experts across relationship, career, anxiety, sleep, addiction and more. Their proprietary Programs and Moodboosts help users gain new perspectives and tools to change behaviors and live life in forward motion. Digly features:  Programs   Created exclusively for Digly, programs are designed to give users unique insights and tools to feed their appetite for personal growth. Users can listen to bite-sized Coaching sessions and learn new tools such as meditations, breathing exercises, visualizations and affirmations. By offering a holistic, multi-faceted approach, users will be able to reach peak mental performance and gain a deeper understanding of their physical, emotional and spiritual well-being. Moodboost   On a daily basis, people experience a wide range of emotions. Whether they're nervous about a work meeting, having trouble sleeping, or need an extra boost of confidence before their date, Digly's quick daily Moodboosts can help turn a user's negative thoughts into a positive state of mind. Relevant e-solution   Users can track progress and star their favorite sessions and Moodboosts to help them stay on course of their personal growth journey. Users can create a daily mental wellness routine to help them form healthier habits and change behaviors. Ask the Experts   Users can submit personal questions to be answered by Digly experts and see those posed by the community. They will also get Smooth Sailing tips and Words of [place] to help users navigate their day.

## 2022-03-25 NOTE — PROGRESS NOTES
Patient returned call and made aware, patient has appointment for 4/4/2022 to discuss possible changes.

## 2022-06-13 DIAGNOSIS — R06.2 WHEEZING: ICD-10-CM

## 2022-06-13 DIAGNOSIS — R06.02 SOB (SHORTNESS OF BREATH): ICD-10-CM

## 2022-06-13 NOTE — TELEPHONE ENCOUNTER
Comments:     Last Office Visit (last PCP visit):   3/7/2022    Next Visit Date:  No future appointments. **If hasn't been seen in over a year OR hasn't followed up according to last diabetes/ADHD visit, make appointment for patient before sending refill to provider.     Rx requested:  Requested Prescriptions     Pending Prescriptions Disp Refills    VENTOLIN  (90 Base) MCG/ACT inhaler [Pharmacy Med Name: VENTOLIN HFA 90 MCG INHALER] 18 g 1     Sig: inhale 2 puffs by mouth four times a day if needed for wheezing or shortness of breath

## 2022-08-11 DIAGNOSIS — R06.02 SOB (SHORTNESS OF BREATH): ICD-10-CM

## 2022-08-11 DIAGNOSIS — R06.2 WHEEZING: ICD-10-CM

## 2022-08-11 NOTE — TELEPHONE ENCOUNTER
Comments:     Last Office Visit (last PCP visit):   3/7/2022    Next Visit Date:  No future appointments. **If hasn't been seen in over a year OR hasn't followed up according to last diabetes/ADHD visit, make appointment for patient before sending refill to provider.     Rx requested:  Requested Prescriptions     Pending Prescriptions Disp Refills    VENTOLIN  (90 Base) MCG/ACT inhaler [Pharmacy Med Name: VENTOLIN HFA 90 MCG INHALER] 18 g 1     Sig: inhale 2 puffs by mouth four times a day if needed for wheezing or shortness of breath

## 2022-09-29 DIAGNOSIS — R06.2 WHEEZING: ICD-10-CM

## 2022-09-29 DIAGNOSIS — R06.02 SOB (SHORTNESS OF BREATH): ICD-10-CM

## 2022-09-29 NOTE — TELEPHONE ENCOUNTER
Comments:     Last Office Visit (last PCP visit):   3/7/2022    Next Visit Date:  Future Appointments   Date Time Provider Akira Banksi   10/14/2022  2:00 PM [facility] STRESS ROOM [facility] STRES None   10/27/2022 10:30 AM Josefa Tucker MD [facility]       **If hasn't been seen in over a year OR hasn't followed up according to last diabetes/ADHD visit, make appointment for patient before sending refill to provider.     Rx requested:  Requested Prescriptions     Pending Prescriptions Disp Refills    VENTOLIN  (90 Base) MCG/ACT inhaler [Pharmacy Med Name: VENTOLIN HFA 90 MCG INHALER] 18 g 1     Sig: inhale 2 puffs by mouth four times a day if needed for wheezing or shortness of breath

## 2022-10-13 ENCOUNTER — OFFICE VISIT (OUTPATIENT)
Dept: INTERNAL MEDICINE | Age: 27
End: 2022-10-13
Payer: MEDICARE

## 2022-10-13 VITALS
DIASTOLIC BLOOD PRESSURE: 72 MMHG | BODY MASS INDEX: 59.07 KG/M2 | OXYGEN SATURATION: 98 % | SYSTOLIC BLOOD PRESSURE: 124 MMHG | HEART RATE: 97 BPM | RESPIRATION RATE: 16 BRPM | TEMPERATURE: 98.3 F | HEIGHT: 59 IN | WEIGHT: 293 LBS

## 2022-10-13 DIAGNOSIS — J02.9 PHARYNGITIS, UNSPECIFIED ETIOLOGY: ICD-10-CM

## 2022-10-13 DIAGNOSIS — J06.9 VIRAL UPPER RESPIRATORY TRACT INFECTION: Primary | ICD-10-CM

## 2022-10-13 DIAGNOSIS — Z91.09 ENVIRONMENTAL ALLERGIES: ICD-10-CM

## 2022-10-13 LAB — S PYO AG THROAT QL: NORMAL

## 2022-10-13 PROCEDURE — 99213 OFFICE O/P EST LOW 20 MIN: CPT | Performed by: NURSE PRACTITIONER

## 2022-10-13 PROCEDURE — 87880 STREP A ASSAY W/OPTIC: CPT | Performed by: NURSE PRACTITIONER

## 2022-10-13 PROCEDURE — G8427 DOCREV CUR MEDS BY ELIG CLIN: HCPCS | Performed by: NURSE PRACTITIONER

## 2022-10-13 PROCEDURE — G8484 FLU IMMUNIZE NO ADMIN: HCPCS | Performed by: NURSE PRACTITIONER

## 2022-10-13 PROCEDURE — 1036F TOBACCO NON-USER: CPT | Performed by: NURSE PRACTITIONER

## 2022-10-13 PROCEDURE — G8417 CALC BMI ABV UP PARAM F/U: HCPCS | Performed by: NURSE PRACTITIONER

## 2022-10-13 RX ORDER — FLUTICASONE PROPIONATE 50 MCG
1 SPRAY, SUSPENSION (ML) NASAL DAILY
Qty: 16 G | Refills: 5 | Status: SHIPPED | OUTPATIENT
Start: 2022-10-13

## 2022-10-13 RX ORDER — TIRZEPATIDE 2.5 MG/.5ML
INJECTION, SOLUTION SUBCUTANEOUS
COMMUNITY
Start: 2022-10-05

## 2022-10-13 RX ORDER — BROMPHENIRAMINE MALEATE, PSEUDOEPHEDRINE HYDROCHLORIDE, AND DEXTROMETHORPHAN HYDROBROMIDE 2; 30; 10 MG/5ML; MG/5ML; MG/5ML
10 SYRUP ORAL 4 TIMES DAILY PRN
Qty: 200 ML | Refills: 0 | Status: SHIPPED | OUTPATIENT
Start: 2022-10-13

## 2022-10-13 SDOH — ECONOMIC STABILITY: FOOD INSECURITY: WITHIN THE PAST 12 MONTHS, THE FOOD YOU BOUGHT JUST DIDN'T LAST AND YOU DIDN'T HAVE MONEY TO GET MORE.: NEVER TRUE

## 2022-10-13 SDOH — ECONOMIC STABILITY: FOOD INSECURITY: WITHIN THE PAST 12 MONTHS, YOU WORRIED THAT YOUR FOOD WOULD RUN OUT BEFORE YOU GOT MONEY TO BUY MORE.: NEVER TRUE

## 2022-10-13 ASSESSMENT — ENCOUNTER SYMPTOMS
TROUBLE SWALLOWING: 0
ABDOMINAL PAIN: 0
SORE THROAT: 1
SINUS PRESSURE: 1
SINUS PAIN: 1
EYE DISCHARGE: 0
VOMITING: 0
FACIAL SWELLING: 0
COUGH: 1
CHEST TIGHTNESS: 0
NAUSEA: 0
WHEEZING: 0
DIARRHEA: 0
SHORTNESS OF BREATH: 0

## 2022-10-13 ASSESSMENT — SOCIAL DETERMINANTS OF HEALTH (SDOH): HOW HARD IS IT FOR YOU TO PAY FOR THE VERY BASICS LIKE FOOD, HOUSING, MEDICAL CARE, AND HEATING?: NOT HARD AT ALL

## 2022-10-13 NOTE — PROGRESS NOTES
[address] [facility] PRIMARY CARE  [address] [facility] [address]  Dept: [phone]  Dept Fax: [phone]: [address] (: 1995) is a 32 y.o. female, Established patient, here for evaluation of the following chief complaint(s):  Pharyngitis (Sore throat started yesterday AM. Whole family has strep. Patient reports rapid never come back positive for her so would like to have throat culture as well.) and Congestion (Congestion started two days ago facial pain,headache,pressure. Tested negative for COVID at home.)      PCP:  Suad Slaughter MD      Pt here for sore throat that started yesterday. Her sinus pressure started 2 days ago, pressure worst in forehead and drainage running down throat. No fever but has felt flushed. Blows her nose and gross stuff coming out, bloody discharge. Not really coughing, maybe a little at night from the drainage. Is taking her allergy pill (takes every day routinely) and tylenol. Tested negative for covid at home yesterday. Her nephew, sister and mom all with strep in recent days, mom most recent (yesterday). Review of Systems   Constitutional:  Positive for fatigue. Negative for chills, diaphoresis and fever. Facial flushing   HENT:  Positive for congestion, ear pain, postnasal drip, sinus pressure, sinus pain and sore throat. Negative for ear discharge, facial swelling and trouble swallowing. Eyes:  Negative for discharge. Respiratory:  Positive for cough. Negative for chest tightness, shortness of breath and wheezing. Cardiovascular:  Negative for chest pain. Gastrointestinal:  Negative for abdominal pain, diarrhea, nausea and vomiting. Musculoskeletal:  Negative for myalgias. Skin:  Negative for rash. Neurological:  Positive for headaches. Hematological:  Negative for adenopathy.      Past Medical History:   Diagnosis Date Abnormal uterine bleeding 2018    Anxiety     Asthma     Back pain     Blurred vision     BMI 50.0-59.9, adult ([facility])     Chronic back pain     Class 3 severe obesity due to excess calories without serious comorbidity with body mass index (BMI) of 60.0 to 69.9 in adult ([facility] 2020    COVID-19 vaccine series completed     Moderna 1st dose 2021 2nd dose 2021    Daytime sleepiness     Daytime sleepiness     Depression     Depressive disorder, not elsewhere classified 2015    Diabetes mellitus ([facility]     Difficulty sleeping     BRITT (dyspnea on exertion)     DUB (dysfunctional uterine bleeding)     Fatigue     GERD (gastroesophageal reflux disease)     Heartburn     History of UTI     Irregular menses     Irregular menses     Joint pain, knee     Osteoarthritis     Psoriasis     Scoliosis     surgery     Shortness of breath at rest     Shortness of breath at rest     Snoring     SOBOE (shortness of breath on exertion)     SOBOE (shortness of breath on exertion)     Type 2 diabetes mellitus without complication, without long-term current use of insulin ([facility] 10/01/2020     Past Surgical History:   Procedure Laterality Date    BACK SURGERY      ENDOSCOPY, COLON, DIAGNOSTIC      EGD Dr Brook Lam      for scoliosis    WISDOM TOOTH EXTRACTION       Social History     Socioeconomic History    Marital status:      Spouse name: Not on file    Number of children: Not on file    Years of education: Not on file    Highest education level: Not on file   Occupational History    Not on file   Tobacco Use    Smoking status: Former     Packs/day: 0.25     Years: 4.00     Pack years: 1.00     Types: Cigarettes     Quit date: 2017     Years since quittin.1    Smokeless tobacco: Never   Vaping Use    Vaping Use: Former   Substance and Sexual Activity    Alcohol use: Not Currently     Comment: SOCIALLY WEEKENDS    Drug use: Never    Sexual activity: Yes     Partners: Male Birth control/protection: Inserts     Comment: Nuvaring   Other Topics Concern    Not on file   Social History Narrative    ** Merged History Encounter **          Social Determinants of Health     Financial Resource Strain: Low Risk     Difficulty of Paying Living Expenses: Not hard at all   Food Insecurity: No Food Insecurity    Worried About Running Out of Food in the Last Year: Never true    Ran Out of Food in the Last Year: Never true   Transportation Needs: Not on file   Physical Activity: Not on file   Stress: Not on file   Social Connections: Not on file   Intimate Partner Violence: Not on file   Housing Stability: Not on file     Family History   Problem Relation Age of Onset    Diabetes Mother     High Cholesterol Mother     Miscarriages / Stillbirths Mother     Arthritis Mother     Obesity Mother     Hypertension Mother     Stroke Mother     Allergy (Severe) Father     Obesity Father     High Blood Pressure Sister     Obesity Sister     Breast Cancer Maternal Aunt     Breast Cancer Maternal Grandmother     Cancer Maternal Grandmother         breast, unknown age of dx    Obesity Maternal Grandmother     Diabetes Maternal Grandmother     Hypertension Maternal Grandmother     Stroke Maternal Grandmother     Prostate Cancer Maternal Grandfather     Heart Disease Maternal Grandfather     Allergy (Severe) Paternal Grandmother     Arthritis Paternal Grandmother     Depression Paternal Grandmother     Diabetes Paternal Grandmother     Hypertension Paternal Grandmother     Other Paternal Grandmother         blood clot    Allergy (Severe) Paternal Grandfather     Arthritis Paternal Grandfather     Asthma Paternal Grandfather     Diabetes Paternal Grandfather     Heart Attack Paternal Grandfather     Heart Disease Paternal Grandfather     High Blood Pressure Paternal Grandfather     High Cholesterol Paternal Grandfather     Kidney Disease Paternal Grandfather     Osteoporosis Paternal Grandfather     Substance Abuse Paternal Grandfather     Obesity Paternal Grandfather     Stroke Paternal Grandfather     High Blood Pressure Sister     Cancer Maternal Aunt         breast, unknown age of dx    Cancer Maternal Aunt         breast, unknown age of dx      Allergies   Allergen Reactions    Motrin [Ibuprofen] Hives and Swelling    Pineapple Hives    Fetzima [Levomilnacipran] Nausea And Vomiting     Prior to Admission medications    Medication Sig Start Date End Date Taking?  Authorizing Provider   MOUNJARO 2.5 MG/0.5ML SOPN SC injection inject 2 & 1/2 milligrams subcutaneously every week 10/5/22  Yes Historical Provider, MD   fluticasone (FLONASE) 50 MCG/ACT nasal spray 1 spray by Each Nostril route daily 10/13/22  Yes VIET Og CNP   brompheniramine-pseudoephedrine-DM (BROMFED DM) 2-30-10 MG/5ML syrup Take 10 mLs by mouth 4 times daily as needed for Cough or Congestion 10/13/22  Yes VIET Og CNP   VENTOLIN  (90 Base) MCG/ACT inhaler inhale 2 puffs by mouth four times a day if needed for wheezing or shortness of breath 9/29/22  Yes Paul Urbina MD   vitamin D3 (CHOLECALCIFEROL) 25 MCG (1000 UT) TABS tablet  2/8/22  Yes Historical Provider, MD   ixekizumab (TALTZ) 80 MG/ML SOAJ prefilled syringe Inject into the skin 12/8/21  Yes Historical Provider, MD   metFORMIN (GLUCOPHAGE) 500 MG tablet Take 500 mg by mouth 2 times daily (with meals) Indications: Diabetes  10/8/20  Yes Historical Provider, MD   Blood Glucose Monitoring Suppl (ONE TOUCH ULTRA 2) w/Device KIT  10/8/20  Yes Historical Provider, MD   Blood Glucose Monitoring Suppl (ONE TOUCH ULTRA MINI) w/Device KIT 1 each by NOT APPLICABLE route daily 86/7/81 10/13/22 Yes Historical Provider, MD   ONETOUCH ULTRA strip  10/8/20  Yes Historical Provider, MD   cetirizine (ZYRTEC) 10 MG tablet Take 10 mg by mouth daily Indications: Seasonal Allergy    Yes Historical Provider, MD                I have reviewed the patient's medical history in detail and updated the computerized patient record. OBJECTIVE    Vitals:    10/13/22 1054   BP: 124/72   Site: Right Lower Arm   Position: Sitting   Cuff Size: Medium Adult   Pulse: 97   Resp: 16   Temp: 98.3 °F (36.8 °C)   TempSrc: Oral   SpO2: 98%   Weight: (!) 335 lb 6.4 oz (152.1 kg)   Height: 4' 11\" (1.499 m)       Physical Exam  Vitals and nursing note reviewed. Constitutional:       Appearance: Normal appearance. She is not ill-appearing. HENT:      Head: Normocephalic and atraumatic. Right Ear: A middle ear effusion is present. Tympanic membrane is bulging. Left Ear: A middle ear effusion is present. Tympanic membrane is bulging. Nose: Mucosal edema and congestion present. Right Turbinates: Swollen. Left Turbinates: Swollen. Right Sinus: Frontal sinus tenderness present. No maxillary sinus tenderness. Left Sinus: Frontal sinus tenderness present. No maxillary sinus tenderness. Mouth/Throat:      Lips: Pink. Mouth: Mucous membranes are moist.      Pharynx: Posterior oropharyngeal erythema present. Tonsils: No tonsillar exudate. Comments: Mild redness noted to right tonsillar pillar without edema or exudate  Eyes:      Conjunctiva/sclera: Conjunctivae normal.   Cardiovascular:      Rate and Rhythm: Normal rate and regular rhythm. Heart sounds: Normal heart sounds. Pulmonary:      Effort: Pulmonary effort is normal. No respiratory distress. Breath sounds: Normal breath sounds. Abdominal:      Palpations: Abdomen is soft. Tenderness: There is no abdominal tenderness. Musculoskeletal:      Cervical back: Normal range of motion and neck supple. Tenderness present. Lymphadenopathy:      Cervical: Cervical adenopathy present. Skin:     General: Skin is warm and dry. Neurological:      Mental Status: She is alert and oriented to person, place, and time. Psychiatric:         Mood and Affect: Mood normal.         Thought Content:  Thought content normal.         ASSESSMENT/ PLAN    1. Viral upper respiratory tract infection  Acute, negative for covid at home yesterday. Advised could repeat in 1-2 days if still feeling ill. Suspect is viral illness. Treat with otc meds  - fluticasone (FLONASE) 50 MCG/ACT nasal spray; 1 spray by Each Nostril route daily  Dispense: 16 g; Refill: 5  - brompheniramine-pseudoephedrine-DM (BROMFED DM) 2-30-10 MG/5ML syrup; Take 10 mLs by mouth 4 times daily as needed for Cough or Congestion  Dispense: 200 mL; Refill: 0    2. Pharyngitis, unspecified etiology  Acute, strep exposure at home  - Culture, Throat; Future, sent  - POCT rapid strep A, negative    3. Environmental allergies  Acute, chronic issue. Continue otc antihistamine, add flonase  - fluticasone (FLONASE) 50 MCG/ACT nasal spray; 1 spray by Each Nostril route daily  Dispense: 16 g; Refill: 5        Return if symptoms worsen or fail to improve.      Electronically signed by:  VIET Patricio CNP   10/13/22

## 2022-10-13 NOTE — LETTER
[facility] Primary Care  [place] [address]  Phone: [phone]  Fax: [phone], APRN - CNP        October 13, 2022     Patient: Sirisha Melendez   YOB: 1995   Date of Visit: 10/13/2022       To Whom It May Concern: It is my medical opinion that [place] be excused from work 10/12-10/13/22. If you have any questions or concerns, please don't hesitate to call.     Sincerely,        VIET Polk CNP

## 2022-10-14 ENCOUNTER — PATIENT MESSAGE (OUTPATIENT)
Dept: INTERNAL MEDICINE | Age: 27
End: 2022-10-14

## 2022-10-14 NOTE — TELEPHONE ENCOUNTER
From: Bandar Anderson  To: Maru Wilkes  Sent: 10/14/2022 1:02 PM EDT  Subject: Doctors note     Hello, I was seen yesterday. I missed work again today. Am I able to get a doctors note to cover today as well? Ill be returning to work tomorrow. I have a note covering yesterday and Wednesday already.      Thank you,  [place]

## 2022-10-14 NOTE — LETTER
[facility] Primary Care  [address]  [place] [address]  Phone: [phone]  Fax: [phone], APRN - CNP        October 17, 2022     Patient: Kirill Esqueda   YOB: 1995   Date of Visit: 10/14/2022       To Whom It May Concern: It is my medical opinion that Shad Baker may return to work on 10/15/22. Please excuse her for 10/14/22 due ongoing illness symptoms at that time. .    If you have any questions or concerns, please don't hesitate to call.     Sincerely,        VIET Truong CNP

## 2022-10-17 ENCOUNTER — TELEPHONE (OUTPATIENT)
Dept: INTERNAL MEDICINE | Age: 27
End: 2022-10-17

## 2022-10-17 LAB
ORGANISM: ABNORMAL
THROAT CULTURE: ABNORMAL
THROAT CULTURE: ABNORMAL

## 2022-10-17 NOTE — TELEPHONE ENCOUNTER
Pt came in on Thursday last week and saw you and was given a work note, but she needs one that is extended to Friday.

## 2022-11-26 DIAGNOSIS — R06.02 SOB (SHORTNESS OF BREATH): ICD-10-CM

## 2022-11-26 DIAGNOSIS — R06.2 WHEEZING: ICD-10-CM

## 2022-11-28 NOTE — TELEPHONE ENCOUNTER
Comments:     Last Office Visit (last PCP visit):   3/7/2022    Next Visit Date:  No future appointments. **If hasn't been seen in over a year OR hasn't followed up according to last diabetes/ADHD visit, make appointment for patient before sending refill to provider.     Rx requested:  Requested Prescriptions     Pending Prescriptions Disp Refills    VENTOLIN  (90 Base) MCG/ACT inhaler [Pharmacy Med Name: VENTOLIN HFA 90 MCG INHALER] 18 g 1     Sig: inhale 2 puffs by mouth and INTO THE LUNGS four times a day if needed for wheezing or shortness of breath

## 2022-12-27 ENCOUNTER — OFFICE VISIT (OUTPATIENT)
Dept: INTERNAL MEDICINE | Age: 27
End: 2022-12-27
Payer: COMMERCIAL

## 2022-12-27 VITALS
BODY MASS INDEX: 59.07 KG/M2 | DIASTOLIC BLOOD PRESSURE: 78 MMHG | HEIGHT: 59 IN | TEMPERATURE: 97.9 F | OXYGEN SATURATION: 97 % | HEART RATE: 95 BPM | SYSTOLIC BLOOD PRESSURE: 124 MMHG | WEIGHT: 293 LBS

## 2022-12-27 DIAGNOSIS — R68.89 FLU-LIKE SYMPTOMS: ICD-10-CM

## 2022-12-27 DIAGNOSIS — J06.9 ACUTE URI: Primary | ICD-10-CM

## 2022-12-27 DIAGNOSIS — Z20.822 ENCOUNTER FOR SCREENING LABORATORY TESTING FOR COVID-19 VIRUS: ICD-10-CM

## 2022-12-27 LAB
INFLUENZA A ANTIBODY: NEGATIVE
INFLUENZA B ANTIBODY: NEGATIVE
Lab: NORMAL
PERFORMING INSTRUMENT: NORMAL
QC PASS/FAIL: NORMAL
SARS-COV-2, POC: NORMAL

## 2022-12-27 PROCEDURE — G8417 CALC BMI ABV UP PARAM F/U: HCPCS | Performed by: NURSE PRACTITIONER

## 2022-12-27 PROCEDURE — G8484 FLU IMMUNIZE NO ADMIN: HCPCS | Performed by: NURSE PRACTITIONER

## 2022-12-27 PROCEDURE — 99213 OFFICE O/P EST LOW 20 MIN: CPT | Performed by: NURSE PRACTITIONER

## 2022-12-27 PROCEDURE — 87426 SARSCOV CORONAVIRUS AG IA: CPT | Performed by: NURSE PRACTITIONER

## 2022-12-27 PROCEDURE — G8427 DOCREV CUR MEDS BY ELIG CLIN: HCPCS | Performed by: NURSE PRACTITIONER

## 2022-12-27 PROCEDURE — 1036F TOBACCO NON-USER: CPT | Performed by: NURSE PRACTITIONER

## 2022-12-27 PROCEDURE — 87804 INFLUENZA ASSAY W/OPTIC: CPT | Performed by: NURSE PRACTITIONER

## 2022-12-27 RX ORDER — AZITHROMYCIN 250 MG/1
250 TABLET, FILM COATED ORAL SEE ADMIN INSTRUCTIONS
Qty: 6 TABLET | Refills: 0 | Status: SHIPPED | OUTPATIENT
Start: 2022-12-27 | End: 2023-01-01

## 2022-12-27 RX ORDER — TIRZEPATIDE 5 MG/.5ML
INJECTION, SOLUTION SUBCUTANEOUS
COMMUNITY
Start: 2022-11-30

## 2022-12-27 ASSESSMENT — ENCOUNTER SYMPTOMS
DIARRHEA: 0
WHEEZING: 1
SHORTNESS OF BREATH: 0
SORE THROAT: 1
RHINORRHEA: 0
VOMITING: 0
SINUS PAIN: 0
ABDOMINAL PAIN: 0
NAUSEA: 0
COUGH: 1
SINUS PRESSURE: 0

## 2022-12-27 NOTE — PROGRESS NOTES
Subjective:      Patient ID: Ligia Cleveland is a 32 y.o. female who presents today for:  Chief Complaint   Patient presents with    URI     HA, fever, congestion, cough       URI   This is a new problem. Episode onset: 1 week ago. The problem has been gradually worsening. Maximum temperature: 99.3 F. The fever has been present for Less than 1 day. Associated symptoms include congestion, coughing, headaches, a sore throat and wheezing. Pertinent negatives include no abdominal pain, chest pain, diarrhea, ear pain, nausea, rash, rhinorrhea, sinus pain or vomiting. She has tried acetaminophen for the symptoms. The treatment provided mild relief.      Past Medical History:   Diagnosis Date    Abnormal uterine bleeding 11/27/2018    Anxiety     Asthma     Back pain     Blurred vision     BMI 50.0-59.9, adult ([facility])     Chronic back pain     Class 3 severe obesity due to excess calories without serious comorbidity with body mass index (BMI) of 60.0 to 69.9 in adult [facility]) 09/30/2020    COVID-19 vaccine series completed     Moderna 1st dose 5/5/2021 2nd dose 6/5/2021    Daytime sleepiness     Daytime sleepiness     Depression     Depressive disorder, not elsewhere classified 08/07/2015    Diabetes mellitus ([address]     Difficulty sleeping     BRITT (dyspnea on exertion)     DUB (dysfunctional uterine bleeding)     Fatigue     GERD (gastroesophageal reflux disease)     Heartburn     History of UTI     Irregular menses     Irregular menses     Joint pain, knee     Osteoarthritis     Psoriasis     Scoliosis     surgery 2008    Shortness of breath at rest     Shortness of breath at rest     Snoring     SOBOE (shortness of breath on exertion)     SOBOE (shortness of breath on exertion)     Type 2 diabetes mellitus without complication, without long-term current use of insulin ([address] 10/01/2020     Past Surgical History:   Procedure Laterality Date    BACK SURGERY      ENDOSCOPY, COLON, DIAGNOSTIC  !/31/2022    EGD Dr Diana Galindo SURGERY      for scoliosis    WISDOM TOOTH EXTRACTION       Family History   Problem Relation Age of Onset    Diabetes Mother     High Cholesterol Mother     Miscarriages / [place] Mother     Arthritis Mother     Obesity Mother     Hypertension Mother     Stroke Mother     Allergy (Severe) Father     Obesity Father     High Blood Pressure Sister     Obesity Sister     Breast Cancer Maternal Aunt     Breast Cancer Maternal Grandmother     Cancer Maternal Grandmother         breast, unknown age of dx    Obesity Maternal Grandmother     Diabetes Maternal Grandmother     Hypertension Maternal Grandmother     Stroke Maternal Grandmother     Prostate Cancer Maternal Grandfather     Heart Disease Maternal Grandfather     Allergy (Severe) Paternal Grandmother     Arthritis Paternal Grandmother     Depression Paternal Grandmother     Diabetes Paternal Grandmother     Hypertension Paternal Grandmother     Other Paternal Grandmother         blood clot    Allergy (Severe) Paternal Grandfather     Arthritis Paternal Grandfather     Asthma Paternal Grandfather     Diabetes Paternal Grandfather     Heart Attack Paternal Grandfather     Heart Disease Paternal Grandfather     High Blood Pressure Paternal Grandfather     High Cholesterol Paternal Grandfather     Kidney Disease Paternal Grandfather     Osteoporosis Paternal Grandfather     Substance Abuse Paternal Grandfather     Obesity Paternal Grandfather     Stroke Paternal Grandfather     High Blood Pressure Sister     Cancer Maternal Aunt         breast, unknown age of dx    Cancer Maternal Aunt         breast, unknown age of dx     Allergies   Allergen Reactions    Motrin [Ibuprofen] Hives and Swelling    Pineapple Hives    Fetzima [Levomilnacipran] Nausea And Vomiting         Review of Systems   Constitutional:  Positive for fever (99.3 F). Negative for chills and fatigue. HENT:  Positive for congestion, postnasal drip and sore throat.  Negative for ear pain, rhinorrhea, sinus pressure and sinus pain. Respiratory:  Positive for cough and wheezing. Negative for shortness of breath. Cardiovascular:  Negative for chest pain. Gastrointestinal:  Negative for abdominal pain, diarrhea, nausea and vomiting. Musculoskeletal:  Negative for arthralgias and myalgias. Skin:  Negative for rash. Neurological:  Positive for headaches. Objective:   /78 (Site: Left Upper Arm, Position: Sitting, Cuff Size: Large Adult)   Pulse 95   Temp 97.9 °F (36.6 °C) (Infrared)   Ht 4' 11\" (1.499 m)   Wt (!) 330 lb (149.7 kg)   LMP  (LMP Unknown)   SpO2 97%   BMI 66.65 kg/m²     Physical Exam  Vitals reviewed. Constitutional:       General: She is not in acute distress. Appearance: She is well-developed. HENT:      Head: Normocephalic. Right Ear: Tympanic membrane, ear canal and external ear normal.      Left Ear: Tympanic membrane, ear canal and external ear normal.      Nose: Congestion present. No mucosal edema or rhinorrhea. Right Sinus: No maxillary sinus tenderness or frontal sinus tenderness. Left Sinus: No maxillary sinus tenderness or frontal sinus tenderness. Mouth/Throat:      Lips: Pink. Mouth: Mucous membranes are moist.      Pharynx: Oropharynx is clear. Posterior oropharyngeal erythema present. No pharyngeal swelling or oropharyngeal exudate. Cardiovascular:      Rate and Rhythm: Normal rate and regular rhythm. Heart sounds: Normal heart sounds. Pulmonary:      Effort: Pulmonary effort is normal. No respiratory distress. Breath sounds: Normal breath sounds. Musculoskeletal:         General: Normal range of motion. Lymphadenopathy:      Cervical: No cervical adenopathy. Skin:     General: Skin is warm and dry. Neurological:      Mental Status: She is alert and oriented to person, place, and time. Assessment:       Diagnosis Orders   1. Acute URI  azithromycin (ZITHROMAX) 250 MG tablet      2.  Encounter for screening laboratory testing for COVID-19 virus  POCT COVID-19, Antigen      3. Flu-like symptoms  POCT Influenza A/B            Plan:      Orders Placed This Encounter   Procedures    POCT COVID-19, Antigen     Order Specific Question:   Is this test for diagnosis or screening? Answer:   Diagnosis of ill patient     Order Specific Question:   Symptomatic for COVID-19 as defined by CDC? Answer:   Yes     Order Specific Question:   Date of Symptom Onset     Answer:   12/25/2022     Order Specific Question:   Hospitalized for COVID-19? Answer:   No     Order Specific Question:   Admitted to ICU for COVID-19? Answer:   No     Order Specific Question:   Employed in healthcare setting? Answer:   No     Order Specific Question:   Resident in a congregate (group) care setting? Answer:   No     Order Specific Question:   Pregnant? Answer:   No     Order Specific Question:   Previously tested for COVID-19? Answer:   Yes    POCT Influenza A/B     Orders Placed This Encounter   Medications    azithromycin (ZITHROMAX) 250 MG tablet     Sig: Take 1 tablet by mouth See Admin Instructions for 5 days 500mg on day 1 followed by 250mg on days 2 - 5     Dispense:  6 tablet     Refill:  0     Flu/covid negative. Reviewed usual course of illness and supportive measures for symptom management. Medication administration and side effects were discussed. Reviewed symptoms requiring follow up/ER. Patient verbalizes understanding. I have reviewed and updated the electronic medical record. Return if symptoms worsen or fail to improve, for follow up with PCP.     Carlos Machado, APRN - NP

## 2023-01-26 DIAGNOSIS — R06.02 SOB (SHORTNESS OF BREATH): ICD-10-CM

## 2023-01-26 DIAGNOSIS — R06.2 WHEEZING: ICD-10-CM

## 2023-02-08 ENCOUNTER — OFFICE VISIT (OUTPATIENT)
Dept: OBGYN CLINIC | Age: 28
End: 2023-02-08
Payer: MEDICAID

## 2023-02-08 VITALS
BODY MASS INDEX: 57.52 KG/M2 | SYSTOLIC BLOOD PRESSURE: 134 MMHG | WEIGHT: 293 LBS | DIASTOLIC BLOOD PRESSURE: 82 MMHG | HEIGHT: 60 IN

## 2023-02-08 DIAGNOSIS — E28.2 PCOS (POLYCYSTIC OVARIAN SYNDROME): ICD-10-CM

## 2023-02-08 DIAGNOSIS — N92.6 IRREGULAR PERIODS/MENSTRUAL CYCLES: ICD-10-CM

## 2023-02-08 DIAGNOSIS — N93.9 ABNORMAL UTERINE BLEEDING (AUB): Primary | ICD-10-CM

## 2023-02-08 DIAGNOSIS — E66.01 CLASS 3 SEVERE OBESITY WITH SERIOUS COMORBIDITY AND BODY MASS INDEX (BMI) OF 60.0 TO 69.9 IN ADULT, UNSPECIFIED OBESITY TYPE (HCC): ICD-10-CM

## 2023-02-08 PROCEDURE — 99214 OFFICE O/P EST MOD 30 MIN: CPT | Performed by: OBSTETRICS & GYNECOLOGY

## 2023-02-08 RX ORDER — MEDROXYPROGESTERONE ACETATE 10 MG/1
10 TABLET ORAL 2 TIMES DAILY
Qty: 60 TABLET | Refills: 3 | Status: SHIPPED | OUTPATIENT
Start: 2023-02-08

## 2023-02-08 RX ORDER — MEDROXYPROGESTERONE ACETATE 10 MG/1
TABLET ORAL
COMMUNITY
Start: 2023-01-24

## 2023-02-08 ASSESSMENT — ENCOUNTER SYMPTOMS
ABDOMINAL PAIN: 0
DIARRHEA: 0
RECTAL PAIN: 0
ANAL BLEEDING: 0
VOMITING: 0
CONSTIPATION: 0
ABDOMINAL DISTENTION: 0
ALLERGIC/IMMUNOLOGIC NEGATIVE: 1
BLOOD IN STOOL: 0
EYES NEGATIVE: 1
RESPIRATORY NEGATIVE: 1
NAUSEA: 0

## 2023-02-08 NOTE — PATIENT INSTRUCTIONS
ENDOSEE - OFFICE HYSTEROSCOPY    Endosee Office Hysteroscopy is a diagnostic tool that lets your doctor see the inside of your uterus quickly and easily right in her office, without the need for general anesthesia in an operating room. Devonte Alegre helps find the cause of several common issues women face such as abnormal bleeding and infertility concerns. Before using Endosee, your doctor will need to look at your health history, but most women are able to have the procedure without a problem. Do I need to do anything prior to my Endosee? 1. Ultrasound of pelvis complete and Ultrasound non-ob transvaginal to be done before the Endosee. Ultrasounds will be scheduled at check-out along with Endosee and follow-up office visit for results 2 weeks after Endosee. 2.  No unprotected intercourse for 3 weeks prior to procedure  3. Take 800mg of Motrin 1 hour prior to your procedure  4. If you start your period, you can still have the Endosee done if your bleeding is very light. If your cycle is heavy please call to reschedule. 5.  Avoid vaginal medications or creams & douching for 24 hours before the procedure. 6.  Please come prepared to leave a urine sample prior to your procedure. What can I expect when I go home? 1. You may have some spotting or light discharge for up to 24 hours. 2.  You can resume all normal activities. 3.  The procedure may start your period. *If a biopsy is done, you will need to schedule a follow up appointment for 2 weeks following your procedure to get your results from the physician. Patient Education        Abnormal Uterine Bleeding: Care Instructions  Overview     Abnormal uterine bleeding is irregular bleeding from the uterus. It may be bleeding that is heavier, lighter, or lasts longer than your usual period. Or it may be bleeding that doesn't occur at your regular time. Sometimes it is caused by changes in hormone levels.  It can also be caused by growths in the uterus, such as fibroids or polyps. Sometimes a cause cannot be found. You may have bleeding when you are not expecting your period. Your doctor may suggest a pregnancy test.  Follow-up care is a key part of your treatment and safety. Be sure to make and go to all appointments, and call your doctor if you are having problems. It's also a good idea to know your test results and keep a list of the medicines you take. How can you care for yourself at home? Be safe with medicines. Take pain medicines exactly as directed. If the doctor gave you a prescription medicine for pain, take it as prescribed. If you are not taking a prescription pain medicine, ask your doctor if you can take an over-the-counter medicine. You may be low in iron because of blood loss. Eat a balanced diet that is high in iron and vitamin C. Foods rich in iron include red meat, shellfish, eggs, beans, and leafy green vegetables. Talk to your doctor about whether you need to take iron pills or a multivitamin. When should you call for help? Call 911 anytime you think you may need emergency care. For example, call if:    You passed out (lost consciousness). Call your doctor now or seek immediate medical care if:    You have new or worse belly or pelvic pain. You have severe vaginal bleeding. You feel dizzy or lightheaded, or you feel like you may faint. Watch closely for changes in your health, and be sure to contact your doctor if:    You think you may be pregnant. Your bleeding gets worse. You do not get better as expected. Where can you learn more? Go to http://www.woods.com/ and enter I327 to learn more about \"Abnormal Uterine Bleeding: Care Instructions. \"  Current as of: August 2, 2022               Content Version: 13.5  © 2535-8178 [employer]. Care instructions adapted under license by [facility] ([facility]).  If you have questions about a medical condition or this instruction, always ask your healthcare professional. [address] any warranty or liability for your use of this information.

## 2023-02-08 NOTE — PROGRESS NOTES
Patient here c/o prolonged cycle since 12/23/2022. Reviewed medical, surgical, social and family history. Also reviewed current medications and allergies. Patient seen for same complaint 2020 and placed on Depo Provera. EMB at that time was negative. Irregular cycles on Depo Provera and eventually changed to [address]. States she stopped Nuvaring on her own last 3/22 and did not have a cycle until 12/22. Started bleeding 12/23 and has not stopped bleeding. States some days heavier than others. She was placed on provera 10 mg per day by PCP and bleeding slowed, but when stopped Provera, VB restarted and now heavy again. Denies dizziness. Discussed anovulatory VB c/w probable PCOS, obesity and affects on cycles and endometrium and diet modification and exercise. Ordered labs and US. Will schedule Endosee with bx to R/O endometrial pathology. Discussed options for bleeding control once results available depending on how immediate desire is for fertility. Started Provera 10 mg po bid for bleeding control until w/u complete. Referred to RD for consult. On Mounjaro per PCP. All questions answered. F/U as directed. Diet :      All adults will lose weight when fed <1000 kcal/day. Thus, even subjects who are concerned that they are \"metabolically resistant\" to weight loss will lose weight if they comply with a diet of 800 to 1200 kcal/day. More severe caloric restriction might be expected to induce weight loss more quickly, but a comparison with 400 versus 800 kcal/day diet formulas showed no difference in weight loss, presumably due to slowing of resting metabolic rate. We thus recommend diets with >800 kcal/day. Exercise: Although less potent than dietary restriction in promoting weight loss, increasing energy expenditure through physical activity is a strong predictor of weight loss maintenance.  Physical activity should be performed for approximately 30 minutes or more, five to seven days a week, to prevent weight gain and to improve cardiovascular health. There appears to be a dose effect for physical activity and weight loss, and much greater amounts of exercise are necessary to produce significant weight loss in the absence of a calorically-restricted diet. Therefore, when weight loss is the desired goal, a diet should be combined with physical activity and the activity should be gradually increased over time as tolerated by the patient. A multicomponent program that includes aerobic and resistance training is preferred. Existing medical conditions, age, and preferences for types of exercise should all be considered in the decisions. Behavior modification -- Behavior modification or behavior therapy is one cornerstone in the treatment for obesity. The goal of behavioral therapy is to help patients make long-term changes in their eating behavior by modifying and monitoring their food intake, modifying their physical activity, and controlling cues and stimuli in the environment that trigger eating.  These concepts are usually included in programs conducted by psychologists or other trained personnel as well as many self-help groups            Vitals:  /82   Ht 5' (1.524 m)   Wt (!) 329 lb (149.2 kg)   BMI 64.25 kg/m²   Past Medical History:   Diagnosis Date    Abnormal uterine bleeding 11/27/2018    Anxiety     Asthma     Back pain     Blurred vision     BMI 50.0-59.9, adult ([facility])     Chronic back pain     Class 3 severe obesity due to excess calories without serious comorbidity with body mass index (BMI) of 60.0 to 69.9 in adult ([address] 09/30/2020    COVID-19 vaccine series completed     Moderna 1st dose 5/5/2021 2nd dose 6/5/2021    Daytime sleepiness     Daytime sleepiness     Depression     Depressive disorder, not elsewhere classified 08/07/2015    Diabetes mellitus ([facility]     Difficulty sleeping     BRITT (dyspnea on exertion)     DUB (dysfunctional uterine bleeding)     Fatigue     GERD (gastroesophageal reflux disease)     Heartburn     History of UTI     Irregular menses     Irregular menses     Joint pain, knee     Osteoarthritis     Psoriasis     Scoliosis     surgery 2008    Shortness of breath at rest     Shortness of breath at rest     Snoring     SOBOE (shortness of breath on exertion)     SOBOE (shortness of breath on exertion)     Type 2 diabetes mellitus without complication, without long-term current use of insulin ([facility] [address] 10/01/2020     Past Surgical History:   Procedure Laterality Date    BACK SURGERY      ENDOSCOPY, COLON, DIAGNOSTIC  !/31/2022    EGD Dr Donna Roberto      for scoliosis    WISDOM TOOTH EXTRACTION       Allergies:  Motrin [ibuprofen], Pineapple, and Fetzima [levomilnacipran]  Current Outpatient Medications   Medication Sig Dispense Refill    medroxyPROGESTERone (PROVERA) 10 MG tablet take 1 tablet by mouth once daily      medroxyPROGESTERone (PROVERA) 10 MG tablet Take 1 tablet by mouth in the morning and at bedtime 60 tablet 3    VENTOLIN  (90 Base) MCG/ACT inhaler inhale 2 puffs by mouth and INTO THE LUNGS four times a day if needed for wheezing or shortness of breath 18 g 1    MOUNJARO 5 MG/0.5ML SOPN SC injection inject 0.5 milliliters subcutaneously every week      fluticasone (FLONASE) 50 MCG/ACT nasal spray 1 spray by Each Nostril route daily 16 g 5    vitamin D3 (CHOLECALCIFEROL) 25 MCG (1000 UT) TABS tablet       ixekizumab (TALTZ) 80 MG/ML SOAJ prefilled syringe Inject into the skin      metFORMIN (GLUCOPHAGE) 500 MG tablet Take 500 mg by mouth 2 times daily (with meals) Indications: Diabetes       Blood Glucose Monitoring Suppl (ONE TOUCH ULTRA 2) w/Device KIT       ONETOUCH ULTRA strip       cetirizine (ZYRTEC) 10 MG tablet Take 10 mg by mouth daily Indications: Seasonal Allergy       Blood Glucose Monitoring Suppl (ONE TOUCH ULTRA MINI) w/Device KIT 1 each by NOT APPLICABLE route daily       No current facility-administered medications for this visit.      Social History     Socioeconomic History    Marital status:      Spouse name: Not on file    Number of children: Not on file    Years of education: Not on file    Highest education level: Not on file   Occupational History    Not on file   Tobacco Use    Smoking status: Former     Packs/day: 0.25     Years: 4.00     Pack years: 1.00     Types: Cigarettes     Quit date: 2017     Years since quittin.4    Smokeless tobacco: Never   Vaping Use    Vaping Use: Former   Substance and Sexual Activity    Alcohol use: Yes     Comment: SOCIALLY WEEKENDS    Drug use: Never    Sexual activity: Yes     Partners: Male     Birth control/protection: None   Other Topics Concern    Not on file   Social History Narrative    ** Merged History Encounter **          Social Determinants of Health     Financial Resource Strain: Low Risk     Difficulty of Paying Living Expenses: Not hard at all   Food Insecurity: No Food Insecurity    Worried About Running Out of Food in the Last Year: Never true    Ran Out of Food in the Last Year: Never true   Transportation Needs: Not on file   Physical Activity: Not on file   Stress: Not on file   Social Connections: Not on file   Intimate Partner Violence: Not on file   Housing Stability: Not on file        Family History   Problem Relation Age of Onset    Diabetes Mother     High Cholesterol Mother     Delilah Mike / Stillbirths Mother     Arthritis Mother     Obesity Mother     Hypertension Mother     Stroke Mother     Allergy (Severe) Father     Obesity Father     High Blood Pressure Sister     Obesity Sister     Breast Cancer Maternal Aunt     Breast Cancer Maternal Grandmother     Cancer Maternal Grandmother         breast, unknown age of dx    Obesity Maternal Grandmother     Diabetes Maternal Grandmother     Hypertension Maternal Grandmother     Stroke Maternal Grandmother     Prostate Cancer Maternal Grandfather     Heart Disease Maternal Grandfather     Allergy (Severe) Paternal Grandmother     Arthritis Paternal Grandmother     Depression Paternal Grandmother     Diabetes Paternal Grandmother     Hypertension Paternal Grandmother     Other Paternal Grandmother         blood clot    Allergy (Severe) Paternal Grandfather     Arthritis Paternal Grandfather     Asthma Paternal Grandfather     Diabetes Paternal Grandfather     Heart Attack Paternal Grandfather     Heart Disease Paternal Grandfather     High Blood Pressure Paternal Grandfather     High Cholesterol Paternal Grandfather     Kidney Disease Paternal Grandfather     Osteoporosis Paternal Grandfather     Substance Abuse Paternal Grandfather     Obesity Paternal Grandfather     Stroke Paternal Grandfather     High Blood Pressure Sister     Cancer Maternal Aunt         breast, unknown age of dx    Cancer Maternal Aunt         breast, unknown age of dx       Review of Systems   Constitutional: Negative. Negative for activity change, appetite change, chills, diaphoresis, fatigue, fever and unexpected weight change. HENT: Negative. Eyes: Negative. Respiratory: Negative. Cardiovascular: Negative. Gastrointestinal:  Negative for abdominal distention, abdominal pain, anal bleeding, blood in stool, constipation, diarrhea, nausea, rectal pain and vomiting. Endocrine: Negative. Genitourinary:  Positive for menstrual problem (UB). Negative for decreased urine volume, difficulty urinating, dyspareunia, dysuria, enuresis, flank pain, frequency, genital sores, hematuria, pelvic pain, urgency, vaginal bleeding, vaginal discharge and vaginal pain. Musculoskeletal: Negative. Skin: Negative. Allergic/Immunologic: Negative. Neurological: Negative. Hematological: Negative. Psychiatric/Behavioral: Negative. Objective:     Physical Exam  Constitutional:       General: She is not in acute distress. Appearance: Normal appearance. She is well-developed. She is not diaphoretic.    HENT: Head: Normocephalic and atraumatic. Eyes:      Conjunctiva/sclera: Conjunctivae normal.   Cardiovascular:      Rate and Rhythm: Normal rate and regular rhythm. Pulmonary:      Effort: Pulmonary effort is normal. No respiratory distress. Abdominal:      General: There is no distension or abdominal bruit. Palpations: Abdomen is soft. Abdomen is not rigid. There is no shifting dullness, fluid wave, hepatomegaly, splenomegaly, mass or pulsatile mass. Tenderness: There is no abdominal tenderness. There is no guarding or rebound. Negative signs include Blanco's sign and McBurney's sign. Hernia: No hernia is present. There is no hernia in the umbilical area, ventral area, left inguinal area or right inguinal area. Genitourinary:     Labia:         Right: No rash, tenderness, lesion or injury. Left: No rash, tenderness, lesion or injury. Urethra: No prolapse, urethral pain, urethral swelling or urethral lesion. Vagina: Normal. No signs of injury and foreign body. No vaginal discharge, erythema, tenderness or bleeding. Cervix: No cervical motion tenderness, discharge, friability, lesion, erythema, cervical bleeding or eversion. Uterus: Normal. Not deviated, not enlarged, not fixed, not tender and no uterine prolapse. Adnexa: Right adnexa normal and left adnexa normal.        Right: No mass, tenderness or fullness. Left: No mass, tenderness or fullness. Rectum: Normal.      Comments: No cervical masses or CMT. No pelvic or adnexal masses; NT.    Musculoskeletal:         General: No tenderness or deformity. Normal range of motion. Cervical back: Normal range of motion and neck supple. Skin:     General: Skin is warm and dry. Coloration: Skin is not pale. Neurological:      Mental Status: She is alert and oriented to person, place, and time. Motor: No abnormal muscle tone.       Coordination: Coordination normal.   Psychiatric: Behavior: Behavior normal.         Thought Content: Thought content normal.         Judgment: Judgment normal.       Assessment:          Diagnosis Orders   1. Abnormal uterine bleeding (AUB)  CBC with Auto Differential    HCG, Quantitative, Pregnancy    TSH with Reflex    US PELVIS COMPLETE    US NON OB TRANSVAGINAL    US DUP ABD PEL RETRO SCROT COMPLETE    Hemoglobin A1C    Wet prep, genital    C.trachomatis N.gonorrhoeae DNA      2. Class 3 severe obesity with serious comorbidity and body mass index (BMI) of 60.0 to 69.9 in adult, unspecified obesity type [facility])  [facility] Maira Arellano      3. PCOS (polycystic ovarian syndrome)  Testosterone Free and Total, Non-Male    DHEA-Sulfate      4.  Irregular periods/menstrual cycles             Plan:      Medications placedthis encounter:  Orders Placed This Encounter   Medications    medroxyPROGESTERone (PROVERA) 10 MG tablet     Sig: Take 1 tablet by mouth in the morning and at bedtime     Dispense:  60 tablet     Refill:  3         Orders placedthis encounter:  Orders Placed This Encounter   Procedures    Wet prep, genital     Standing Status:   Future     Standing Expiration Date:   2/8/2024    C.trachomatis N.gonorrhoeae DNA     Standing Status:   Future     Standing Expiration Date:   2/8/2024    US PELVIS COMPLETE     Standing Status:   Future     Standing Expiration Date:   2/8/2024    US NON OB TRANSVAGINAL     Standing Status:   Future     Standing Expiration Date:   2/8/2024    US DUP ABD PEL RETRO SCROT COMPLETE     Standing Status:   Future     Standing Expiration Date:   2/8/2024    CBC with Auto Differential     Standing Status:   Future     Standing Expiration Date:   2/8/2024    HCG, Quantitative, Pregnancy     Standing Status:   Future     Standing Expiration Date:   2/8/2024    TSH with Reflex     Standing Status:   Future     Standing Expiration Date:   2/8/2024    Hemoglobin A1C     Standing Status:   Future     Standing Expiration Date: 2/8/2024    Testosterone Free and Total, Non-Male     Standing Status:   Future     Standing Expiration Date:   2/8/2024    DHEA-Sulfate     Standing Status:   Future     Standing Expiration Date:   2/8/2024    [address] [facility]     Referral Priority:   Routine     Referral Type:   Eval and Treat     Referral Reason:   Specialty Services Required     Requested Specialty:   Nutrition     Number of Visits Requested:   1         Follow up:  Return for Results Review, Pelvic US, Nutrition Consult, Endosee w/ bx.

## 2023-02-08 NOTE — PROGRESS NOTES
The patient was asked if she would like a chaperone present for her intimate exam. She  Declined the chaperone.  Becki Clark CMA ([address] [place] [address])

## 2023-02-09 ENCOUNTER — HOSPITAL ENCOUNTER (OUTPATIENT)
Dept: ULTRASOUND IMAGING | Age: 28
Discharge: HOME | End: 2023-02-11
Payer: MEDICAID

## 2023-02-09 DIAGNOSIS — N93.9 ABNORMAL UTERINE BLEEDING (AUB): ICD-10-CM

## 2023-02-09 PROCEDURE — 93975 VASCULAR STUDY: CPT

## 2023-02-09 PROCEDURE — 76856 US EXAM PELVIC COMPLETE: CPT

## 2023-02-09 PROCEDURE — 76830 TRANSVAGINAL US NON-OB: CPT

## 2023-03-10 DIAGNOSIS — E28.2 PCOS (POLYCYSTIC OVARIAN SYNDROME): ICD-10-CM

## 2023-03-15 ENCOUNTER — PROCEDURE VISIT (OUTPATIENT)
Dept: OBGYN CLINIC | Age: 28
End: 2023-03-15

## 2023-03-15 VITALS
SYSTOLIC BLOOD PRESSURE: 122 MMHG | WEIGHT: 293 LBS | HEIGHT: 60 IN | BODY MASS INDEX: 57.52 KG/M2 | DIASTOLIC BLOOD PRESSURE: 74 MMHG

## 2023-03-15 DIAGNOSIS — N93.9 ABNORMAL UTERINE BLEEDING (AUB): ICD-10-CM

## 2023-03-15 DIAGNOSIS — N93.9 ABNORMAL UTERINE BLEEDING (AUB): Primary | ICD-10-CM

## 2023-03-15 DIAGNOSIS — Z32.02 URINE PREGNANCY TEST NEGATIVE: ICD-10-CM

## 2023-03-15 LAB
HCG, URINE, POC: NEGATIVE
Lab: NORMAL
NEGATIVE QC PASS/FAIL: NORMAL
POSITIVE QC PASS/FAIL: NORMAL

## 2023-03-15 RX ORDER — BUPROPION HYDROCHLORIDE 300 MG/1
TABLET ORAL
COMMUNITY
Start: 2023-03-03

## 2023-03-15 SDOH — ECONOMIC STABILITY: INCOME INSECURITY: HOW HARD IS IT FOR YOU TO PAY FOR THE VERY BASICS LIKE FOOD, HOUSING, MEDICAL CARE, AND HEATING?: NOT HARD AT ALL

## 2023-03-15 SDOH — ECONOMIC STABILITY: HOUSING INSECURITY
IN THE LAST 12 MONTHS, WAS THERE A TIME WHEN YOU DID NOT HAVE A STEADY PLACE TO SLEEP OR SLEPT IN A SHELTER (INCLUDING NOW)?: NO

## 2023-03-15 SDOH — ECONOMIC STABILITY: FOOD INSECURITY: WITHIN THE PAST 12 MONTHS, YOU WORRIED THAT YOUR FOOD WOULD RUN OUT BEFORE YOU GOT MONEY TO BUY MORE.: NEVER TRUE

## 2023-03-15 SDOH — ECONOMIC STABILITY: FOOD INSECURITY: WITHIN THE PAST 12 MONTHS, THE FOOD YOU BOUGHT JUST DIDN'T LAST AND YOU DIDN'T HAVE MONEY TO GET MORE.: NEVER TRUE

## 2023-03-15 ASSESSMENT — PATIENT HEALTH QUESTIONNAIRE - PHQ9
3. TROUBLE FALLING OR STAYING ASLEEP: 0
9. THOUGHTS THAT YOU WOULD BE BETTER OFF DEAD, OR OF HURTING YOURSELF: 0
2. FEELING DOWN, DEPRESSED OR HOPELESS: 0
8. MOVING OR SPEAKING SO SLOWLY THAT OTHER PEOPLE COULD HAVE NOTICED. OR THE OPPOSITE, BEING SO FIGETY OR RESTLESS THAT YOU HAVE BEEN MOVING AROUND A LOT MORE THAN USUAL: 0
4. FEELING TIRED OR HAVING LITTLE ENERGY: 0
SUM OF ALL RESPONSES TO PHQ9 QUESTIONS 1 & 2: 0
SUM OF ALL RESPONSES TO PHQ QUESTIONS 1-9: 0
7. TROUBLE CONCENTRATING ON THINGS, SUCH AS READING THE NEWSPAPER OR WATCHING TELEVISION: 0
6. FEELING BAD ABOUT YOURSELF - OR THAT YOU ARE A FAILURE OR HAVE LET YOURSELF OR YOUR FAMILY DOWN: 0
5. POOR APPETITE OR OVEREATING: 0
10. IF YOU CHECKED OFF ANY PROBLEMS, HOW DIFFICULT HAVE THESE PROBLEMS MADE IT FOR YOU TO DO YOUR WORK, TAKE CARE OF THINGS AT HOME, OR GET ALONG WITH OTHER PEOPLE: 0
1. LITTLE INTEREST OR PLEASURE IN DOING THINGS: 0
SUM OF ALL RESPONSES TO PHQ QUESTIONS 1-9: 0

## 2023-03-15 NOTE — PROGRESS NOTES
ENDOSEE PROCEDURE NOTE:    Pre-Procedure Dx:  33 yo female with AUB      Post-Procedure Dx:  Same      Procedure:  Endosee procedure and Endometrial pipelle bx with ECC      Provider:  Josette Cavazos M.D. Normal Saline:      Findings:  Patient presents for Endosee procedure. Risks and benefits discussed. Consents signed. Motrin 800 mg po x 1 pre-procedure. SSE inserted for directed visualization of cervix. Cervix prepped with Betadine. Single tooth tenaculum placed on anterior lip of the cervix. Cervical os gently dilated with os finder. Endosee device inserted without difficulty to level of fundus. 25 cc of NS injected in routine fashion. Visualization of intra-uterine cavity and internal cervical os reveals normal appearing endometrium without lesions. NS gently aspirated from cavity and Endosee removed. Endometrial biopsy then performed with pipelle in routine fashion without difficulty. ECC performed. Tenaculum removed from cervix and tenaculum sites hemostatic. Patient appeared to tolerate the procedure well. Discharge instructions discussed. Complications:  None      Disposition:  Stable to home. Discharge instructions discussed. F/U 2 weeks . Pt also here to discuss labs and US. Labs not completed. Encouraged to complete today. US as follows:       EXAMINATION:   PELVIC ULTRASOUND; DOPPLER EVALUATION OF THE PELVIS       2/9/2023       TECHNIQUE:   Transabdominal and transvaginal pelvic duplex ultrasound using B-mode/gray   scaled imaging, Doppler spectral analysis and color flow Doppler was obtained. COMPARISON:   None       HISTORY:   ORDERING SYSTEM PROVIDED HISTORY: Abnormal uterine bleeding (AUB)       FINDINGS:       Measurements:       Uterus: 10.3 cm       Endometrial stripe: 12 mm       Right Ovary:2.4 x 4.0 x 2.1 cm       Left Ovary: 2.1 x 2.8 x 1.4 cm           Ultrasound Findings:       Uterus: Uterus demonstrates normal myometrial echotexture. Endometrial stripe: Endometrium is mildly heterogeneous particularly in the   fundal aspect. Otherwise unremarkable. Right Ovary: Right ovary is within normal limits. There is normal arterial   and venous Doppler flow no right adnexal mass. Left Ovary:  Left ovary is within normal limits. There is normal arterial and   venous Doppler flow. No left adnexal mass. Free Fluid: No evidence of free fluid. Impression   1. Normal appearance of the uterus. Mildly heterogeneous appearance of the   endometrium which is probably physiologic. Endometrial thickness was 12 mm. 2.  Normal appearance of the bilateral ovaries. There are no adnexal masses. Normal Doppler flow within the ovaries. Discussed US results as above and all questions answered. Await results of EMB from today.

## 2023-04-03 DIAGNOSIS — R06.02 SOB (SHORTNESS OF BREATH): ICD-10-CM

## 2023-04-03 DIAGNOSIS — R06.2 WHEEZING: ICD-10-CM

## 2023-04-04 RX ORDER — ALBUTEROL SULFATE 90 UG/1
AEROSOL, METERED RESPIRATORY (INHALATION)
Qty: 18 G | Refills: 1 | OUTPATIENT
Start: 2023-04-04

## 2023-05-30 RX ORDER — MEDROXYPROGESTERONE ACETATE 10 MG/1
TABLET ORAL
Qty: 60 TABLET | Refills: 3 | OUTPATIENT
Start: 2023-05-30

## 2023-07-08 DIAGNOSIS — R06.02 SOB (SHORTNESS OF BREATH): ICD-10-CM

## 2023-07-08 DIAGNOSIS — R06.2 WHEEZING: ICD-10-CM

## 2023-07-10 RX ORDER — ALBUTEROL SULFATE 90 UG/1
AEROSOL, METERED RESPIRATORY (INHALATION)
Qty: 18 G | Refills: 1 | OUTPATIENT
Start: 2023-07-10

## 2023-11-01 ENCOUNTER — OFFICE VISIT (OUTPATIENT)
Dept: FAMILY MEDICINE CLINIC | Age: 28
End: 2023-11-01
Payer: COMMERCIAL

## 2023-11-01 VITALS
TEMPERATURE: 97.6 F | RESPIRATION RATE: 18 BRPM | BODY MASS INDEX: 57.52 KG/M2 | WEIGHT: 293 LBS | OXYGEN SATURATION: 99 % | HEART RATE: 90 BPM | DIASTOLIC BLOOD PRESSURE: 74 MMHG | SYSTOLIC BLOOD PRESSURE: 118 MMHG | HEIGHT: 60 IN

## 2023-11-01 DIAGNOSIS — H65.03 NON-RECURRENT ACUTE SEROUS OTITIS MEDIA OF BOTH EARS: Primary | ICD-10-CM

## 2023-11-01 DIAGNOSIS — J01.40 ACUTE NON-RECURRENT PANSINUSITIS: ICD-10-CM

## 2023-11-01 DIAGNOSIS — H66.001 NON-RECURRENT ACUTE SUPPURATIVE OTITIS MEDIA OF RIGHT EAR WITHOUT SPONTANEOUS RUPTURE OF TYMPANIC MEMBRANE: ICD-10-CM

## 2023-11-01 PROCEDURE — G8484 FLU IMMUNIZE NO ADMIN: HCPCS | Performed by: NURSE PRACTITIONER

## 2023-11-01 PROCEDURE — 99213 OFFICE O/P EST LOW 20 MIN: CPT | Performed by: NURSE PRACTITIONER

## 2023-11-01 PROCEDURE — G8417 CALC BMI ABV UP PARAM F/U: HCPCS | Performed by: NURSE PRACTITIONER

## 2023-11-01 PROCEDURE — 1036F TOBACCO NON-USER: CPT | Performed by: NURSE PRACTITIONER

## 2023-11-01 PROCEDURE — G8427 DOCREV CUR MEDS BY ELIG CLIN: HCPCS | Performed by: NURSE PRACTITIONER

## 2023-11-01 RX ORDER — ETONOGESTREL AND ETHINYL ESTRADIOL .015; .12 MG/D; MG/D
INSERT, EXTENDED RELEASE VAGINAL
COMMUNITY
Start: 2023-08-18 | End: 2023-11-01

## 2023-11-01 RX ORDER — CETIRIZINE HYDROCHLORIDE 10 MG/1
10 TABLET ORAL DAILY
Qty: 30 TABLET | Refills: 0 | Status: SHIPPED | OUTPATIENT
Start: 2023-11-01 | End: 2023-12-01

## 2023-11-01 RX ORDER — FLUTICASONE PROPIONATE 50 MCG
1 SPRAY, SUSPENSION (ML) NASAL DAILY
Qty: 1 EACH | Refills: 1 | Status: SHIPPED | OUTPATIENT
Start: 2023-11-01

## 2023-11-01 RX ORDER — AMOXICILLIN AND CLAVULANATE POTASSIUM 875; 125 MG/1; MG/1
1 TABLET, FILM COATED ORAL 2 TIMES DAILY
Qty: 20 TABLET | Refills: 0 | Status: SHIPPED | OUTPATIENT
Start: 2023-11-01 | End: 2023-11-11

## 2023-11-01 RX ORDER — LANCETS 33 GAUGE
EACH MISCELLANEOUS
COMMUNITY
Start: 2023-08-14

## 2023-11-01 RX ORDER — TOBRAMYCIN 3 MG/ML
SOLUTION/ DROPS OPHTHALMIC
COMMUNITY
Start: 2023-10-25

## 2023-11-01 RX ORDER — LIRAGLUTIDE 6 MG/ML
INJECTION SUBCUTANEOUS
COMMUNITY
Start: 2023-10-13 | End: 2023-11-17

## 2023-11-01 ASSESSMENT — ENCOUNTER SYMPTOMS
WHEEZING: 0
SINUS PRESSURE: 1
SORE THROAT: 1
RHINORRHEA: 1
EYE ITCHING: 0
EYE REDNESS: 0
SHORTNESS OF BREATH: 0
EYE DISCHARGE: 0
COUGH: 1

## 2024-01-23 ENCOUNTER — TELEPHONE (OUTPATIENT)
Dept: OBGYN CLINIC | Age: 29
End: 2024-01-23

## 2024-05-28 ENCOUNTER — OFFICE VISIT (OUTPATIENT)
Dept: FAMILY MEDICINE CLINIC | Age: 29
End: 2024-05-28

## 2024-05-28 VITALS
OXYGEN SATURATION: 96 % | BODY MASS INDEX: 63.71 KG/M2 | DIASTOLIC BLOOD PRESSURE: 82 MMHG | SYSTOLIC BLOOD PRESSURE: 126 MMHG | TEMPERATURE: 97.1 F | WEIGHT: 293 LBS | HEART RATE: 80 BPM

## 2024-05-28 DIAGNOSIS — H65.03 NON-RECURRENT ACUTE SEROUS OTITIS MEDIA OF BOTH EARS: ICD-10-CM

## 2024-05-28 DIAGNOSIS — J01.40 ACUTE NON-RECURRENT PANSINUSITIS: Primary | ICD-10-CM

## 2024-05-28 PROCEDURE — 99213 OFFICE O/P EST LOW 20 MIN: CPT | Performed by: NURSE PRACTITIONER

## 2024-05-28 RX ORDER — AZITHROMYCIN 250 MG/1
TABLET, FILM COATED ORAL
Qty: 6 TABLET | Refills: 0 | Status: SHIPPED | OUTPATIENT
Start: 2024-05-28 | End: 2024-06-07

## 2024-05-28 SDOH — ECONOMIC STABILITY: HOUSING INSECURITY
IN THE LAST 12 MONTHS, WAS THERE A TIME WHEN YOU DID NOT HAVE A STEADY PLACE TO SLEEP OR SLEPT IN A SHELTER (INCLUDING NOW)?: PATIENT DECLINED

## 2024-05-28 SDOH — ECONOMIC STABILITY: FOOD INSECURITY: WITHIN THE PAST 12 MONTHS, YOU WORRIED THAT YOUR FOOD WOULD RUN OUT BEFORE YOU GOT MONEY TO BUY MORE.: PATIENT DECLINED

## 2024-05-28 SDOH — ECONOMIC STABILITY: FOOD INSECURITY: WITHIN THE PAST 12 MONTHS, THE FOOD YOU BOUGHT JUST DIDN'T LAST AND YOU DIDN'T HAVE MONEY TO GET MORE.: PATIENT DECLINED

## 2024-05-28 SDOH — ECONOMIC STABILITY: INCOME INSECURITY: HOW HARD IS IT FOR YOU TO PAY FOR THE VERY BASICS LIKE FOOD, HOUSING, MEDICAL CARE, AND HEATING?: PATIENT DECLINED

## 2024-05-28 ASSESSMENT — ENCOUNTER SYMPTOMS
COUGH: 1
EYE REDNESS: 0
EYE DISCHARGE: 0
EYE ITCHING: 0
WHEEZING: 0
RHINORRHEA: 1

## 2024-05-28 ASSESSMENT — PATIENT HEALTH QUESTIONNAIRE - PHQ9: DEPRESSION UNABLE TO ASSESS: URGENT/EMERGENT SITUATION

## 2024-05-28 NOTE — PROGRESS NOTES
Genia Lee (:  1995) is a 28 y.o. female, Established patient, here for evaluation of the following chief complaint(s):  Otalgia (Left. X 10 days )      Vitals:    24 1154   BP: 126/82   Pulse: 80   Temp: 97.1 °F (36.2 °C)   SpO2: 96%       ASSESSMENT/PLAN:  1. Acute non-recurrent pansinusitis  -     azithromycin (ZITHROMAX) 250 MG tablet; 500mg on day 1 followed by 250mg on days 2 - 5, Disp-6 tablet, R-0Normal   -    Zyrtec and Flonase, pt has  2. Non-recurrent acute serous otitis media of both ears        -    Zyrtec and Flonase, pt has      Return if symptoms worsen or fail to improve.      SUBJECTIVE/OBJECTIVE:    Otalgia   There is pain in the left ear. This is a new problem. Episode onset: x10 days left ear pain. also has congestion, cough, runny nose, and headache. The problem occurs constantly. The problem has been gradually worsening. There has been no fever. Associated symptoms include coughing, headaches and rhinorrhea. Pertinent negatives include no ear discharge. Treatments tried: zyrtec daily. The treatment provided mild relief.         Review of Systems   Constitutional:  Negative for fatigue and fever.   HENT:  Positive for ear pain, postnasal drip and rhinorrhea. Negative for ear discharge.    Eyes:  Negative for discharge, redness and itching.   Respiratory:  Positive for cough. Negative for wheezing.    Cardiovascular:  Negative for chest pain and palpitations.   Musculoskeletal:  Negative for arthralgias and myalgias.   Neurological:  Positive for headaches. Negative for dizziness and light-headedness.         Physical Exam  Vitals reviewed.   Constitutional:       General: She is not in acute distress.     Appearance: Normal appearance.   HENT:      Right Ear: A middle ear effusion is present. Tympanic membrane is not erythematous.      Left Ear: A middle ear effusion is present. Tympanic membrane is not erythematous.      Nose: Mucosal edema present.      Right Turbinates:

## 2024-08-01 ENCOUNTER — TELEPHONE (OUTPATIENT)
Dept: DERMATOLOGY | Facility: CLINIC | Age: 29
End: 2024-08-01

## 2024-08-01 NOTE — TELEPHONE ENCOUNTER
Pt contacted office and states she needs refills of taltz.  Upon chart review it shows patient has not been seen since 12/2022 and would need to be seen in office for evaluation before refills can be sent.   Pt is agreeable to this plan.    Can someone please reach out to patient to schedule for appointment with Dr. Akira Parrish for Taltz refills?  Can utilize post-op slot.    Thank you!    Justyn Herrmann LPN

## 2024-08-08 ASSESSMENT — DERMATOLOGY QUALITY OF LIFE (QOL) ASSESSMENT
RATE HOW BOTHERED YOU ARE BY SYMPTOMS OF YOUR SKIN PROBLEM (EG, ITCHING, STINGING BURNING, HURTING OR SKIN IRRITATION): 2
RATE HOW BOTHERED YOU ARE BY EFFECTS OF YOUR SKIN PROBLEMS ON YOUR ACTIVITIES (EG, GOING OUT, ACCOMPLISHING WHAT YOU WANT, WORK ACTIVITIES OR YOUR RELATIONSHIPS WITH OTHERS): 3
RATE HOW EMOTIONALLY BOTHERED YOU ARE BY YOUR SKIN PROBLEM (FOR EXAMPLE, WORRY, EMBARRASSMENT, FRUSTRATION): 5
WHAT SINGLE SKIN CONDITION LISTED BELOW IS THE PATIENT ANSWERING THE QUALITY-OF-LIFE ASSESSMENT QUESTIONS ABOUT: PSORIASIS

## 2024-08-13 ENCOUNTER — APPOINTMENT (OUTPATIENT)
Dept: DERMATOLOGY | Facility: CLINIC | Age: 29
End: 2024-08-13

## 2024-08-13 ASSESSMENT — PATIENT GLOBAL ASSESSMENT (PGA): WHAT IS THE PGA: PATIENT GLOBAL ASSESSMENT:  2 - MILD

## 2024-08-13 ASSESSMENT — DERMATOLOGY QUALITY OF LIFE (QOL) ASSESSMENT
RATE HOW EMOTIONALLY BOTHERED YOU ARE BY YOUR SKIN PROBLEM (FOR EXAMPLE, WORRY, EMBARRASSMENT, FRUSTRATION): 5
RATE HOW BOTHERED YOU ARE BY SYMPTOMS OF YOUR SKIN PROBLEM (EG, ITCHING, STINGING BURNING, HURTING OR SKIN IRRITATION): 2
WHAT SINGLE SKIN CONDITION LISTED BELOW IS THE PATIENT ANSWERING THE QUALITY-OF-LIFE ASSESSMENT QUESTIONS ABOUT: PSORIASIS
RATE HOW BOTHERED YOU ARE BY EFFECTS OF YOUR SKIN PROBLEMS ON YOUR ACTIVITIES (EG, GOING OUT, ACCOMPLISHING WHAT YOU WANT, WORK ACTIVITIES OR YOUR RELATIONSHIPS WITH OTHERS): 3

## 2024-08-26 ENCOUNTER — APPOINTMENT (OUTPATIENT)
Dept: DERMATOLOGY | Facility: CLINIC | Age: 29
End: 2024-08-26

## 2025-08-05 ENCOUNTER — APPOINTMENT (OUTPATIENT)
Dept: PRIMARY CARE | Facility: CLINIC | Age: 30
End: 2025-08-05